# Patient Record
Sex: FEMALE | Race: ASIAN | NOT HISPANIC OR LATINO | Employment: OTHER | URBAN - METROPOLITAN AREA
[De-identification: names, ages, dates, MRNs, and addresses within clinical notes are randomized per-mention and may not be internally consistent; named-entity substitution may affect disease eponyms.]

---

## 2017-03-10 ENCOUNTER — OFFICE VISIT (OUTPATIENT)
Dept: ENDOCRINOLOGY | Facility: CLINIC | Age: 62
End: 2017-03-10
Attending: FAMILY MEDICINE
Payer: COMMERCIAL

## 2017-03-10 VITALS
HEART RATE: 66 BPM | HEIGHT: 60 IN | SYSTOLIC BLOOD PRESSURE: 116 MMHG | BODY MASS INDEX: 27.76 KG/M2 | DIASTOLIC BLOOD PRESSURE: 72 MMHG | WEIGHT: 141.4 LBS

## 2017-03-10 DIAGNOSIS — Z83.49 FAMILY HISTORY OF THYROID DISEASE: ICD-10-CM

## 2017-03-10 DIAGNOSIS — M85.80 OSTEOPENIA: ICD-10-CM

## 2017-03-10 DIAGNOSIS — E04.2 NON-TOXIC MULTINODULAR GOITER: Primary | ICD-10-CM

## 2017-03-10 LAB — TSH SERPL DL<=0.005 MIU/L-ACNC: 1.37 MU/L (ref 0.4–4)

## 2017-03-10 PROCEDURE — 82306 VITAMIN D 25 HYDROXY: CPT | Performed by: INTERNAL MEDICINE

## 2017-03-10 PROCEDURE — 86376 MICROSOMAL ANTIBODY EACH: CPT | Performed by: INTERNAL MEDICINE

## 2017-03-10 PROCEDURE — 99205 OFFICE O/P NEW HI 60 MIN: CPT | Performed by: INTERNAL MEDICINE

## 2017-03-10 PROCEDURE — 36415 COLL VENOUS BLD VENIPUNCTURE: CPT | Performed by: INTERNAL MEDICINE

## 2017-03-10 PROCEDURE — 84443 ASSAY THYROID STIM HORMONE: CPT | Performed by: INTERNAL MEDICINE

## 2017-03-10 NOTE — LETTER
3/10/2017      RE: [facility] Pino  [address]  [facility] [address]                - Endocrinology Initial Consultation -    Reason for visit/consult:  Thyroid nodules     Primary care provider: Bryanna Sarabia    HPI: 60 yo female here for the evaluation of her thyroid nodules. She has been asymptomatic but she has strong family history of thyroid disease, her sisters (nurses) recommended to check thyroid. Upon her PMD visit, sonogram was sent and showing 3 nodules (2 on the right, one in the left).   Family history of thyroid disease: Mother had goiter, all of her sisters have thyroid issues including cold temperature intolerance. This is the first sonogram for her. Never had thyroid biopsy before.   Asymptomatic.  BW: seasonal fluctuations but stable, no heat/cold intolerance, no constipation, no significant hair loss.  Foods allergy: shrimp,  Energy level: good , very active, full time work  Ethnicity:  female  Prior fragility fracture:no  Parental history of hip fracture:no  Rheumatoid arthritis:no  Secondary cause of osteoporosis:no  Body habitus (BMI less than or equal to 20):no     Sedentary lifestyle: no  Current tabacco smoking:no  History of thyroid disorder:yes  Calcuim and Vitmin D supplements: calcium VitD      Past Medical/Surgical History:  Past Medical History   Diagnosis Date     Allergic rhinitis due to pollen      depression and anxiety-situational      resolved     Dermatophytosis of nail      Hypopotassemia      resolved     Irregular menstrual cycle      Other and unspecified hyperlipidemia      Other chest pain      due to anxiety-neg stress echo      Panic disorder without agoraphobia 2006     resolved     Tennis elbow 2010     Past Surgical History   Procedure Laterality Date      section       x2     Lumpectomy breast  age 23     benign       Allergies:  Allergies   Allergen Reactions     Tuna Flavor Swelling     hives      "Chicken Allergy Hives     Eggs Hives     Shrimp Hives     Tylenol Hives       Current Medications   Current Outpatient Prescriptions   Medication     atorvastatin (LIPITOR) 40 MG tablet     Calcium Citrate-Vitamin D (CALCIUM + D PO)     Cholecalciferol (VITAMIN D3 PO)     No current facility-administered medications for this visit.        Family History:  Family History   Problem Relation Age of Onset     CANCER Mother      pancreatic cancer,  60     DIABETES Maternal Grandmother      Asthma No family hx of      C.A.D. No family hx of      Hypertension No family hx of      CEREBROVASCULAR DISEASE No family hx of      Cancer - colorectal No family hx of      Prostate Cancer No family hx of      DIABETES Mother      Breast Cancer Sister      Thyroid Disease Sister      Thyroid Disease Mother    8 sisters among  them 5 are RN in [place].     Social History:  Social History   Substance Use Topics     Smoking status: Never Smoker     Smokeless tobacco: Never Used      Comment: smoke free household.     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  Lives with , and two sons from first .     ROS:  Full review of systems taken with the help of the intake sheet. Otherwise a complete 14 point review of systems was taken and is negative unless stated in the history above.      Physical Exam:   Blood pressure 116/72, pulse 66, height 1.53 m (5' 0.25\"), weight 64.1 kg (141 lb 6.4 oz).  General: well appearing, no acute distress, pleasant and conversant,   Mental Status/neuro: alert and oriented  Face: symmetrical, normal facial color  Skull: no thinning hair roots  Eyes: anicteric, PERRL, no proptosis or lid lag  Neck: suppler, no lymphadenopahty  Thyroid: mildly enlarged size and coarse texture, no bruits  Back: no scoliosis, no kyphosis  Skin: no dry skin  Heart: regular rhythm, S1S2, no murmur appreciated  Lung: clear to auscultation bilaterally  Abdomen: soft, NT/ND, no " hepatomegaly  Legs: no swelling or edema        Labs (General):   Lab Results   Component Value Date     10/31/2016      Lab Results   Component Value Date    POTASSIUM 3.9 10/31/2016     Lab Results   Component Value Date    CHLORIDE 107 10/31/2016     Lab Results   Component Value Date    KADE 8.9 10/31/2016     Lab Results   Component Value Date    CO2 28 10/31/2016     Lab Results   Component Value Date    BUN 19 10/31/2016     Lab Results   Component Value Date    CR 0.62 10/31/2016     Lab Results   Component Value Date     10/31/2016     Lab Results   Component Value Date    TSH 1.59 10/31/2016     No results found for: T4  Lab Results   Component Value Date    A1C 5.9 10/31/2016         Labs (Hormone Panel):  I extracted pertinent thyroid function test from epic and reviewed since 2007 and also explained to the patient.      Ref. Range 3/2/2007 09:33 10/15/2012 07:59 1/28/2013 13:52 4/7/2014 09:24 10/31/2016 08:23   TSH Latest Ref Range: 0.40 - 4.00 mU/L 1.05 3.37 2.02 2.19 1.59       Sonogram THYROID, 12/22/2016 : I personally reviewed original images and interpreted as #1 isoechoic solid, #2 partial cystic with eccentric solid, #3 partial cystic with eccentric solid. Size agree with report.        (report)  Technique: Grayscale and color ultrasound imaging of the thyroid was  performed.     Findings:   Thyroid parenchyma: homogenous  The right lobe of the thyroid measures: 3.8 x 2.4 x 1.5 cm   The thyroid isthmus measures: 0.8 cm   The left lobe of the thyroid measures: 4.1 x 2.1 x 2 cm      Right lobe:  Nodule 1: Mid  Nodule measurement: 1.3 x 1.4 x 1 cm ,  Echogenicity: Isoechoic  Consistency: solid  Calcifications: coarse  Hypervascular: no     Nodule 2: Inferior  Nodule measurement: 0.8 x 0.9 x 0.5 cm ,  Echogenicity: Isoechoic  Consistency: mixed  Calcifications: no  Hypervascular: no     Left Lobe:   Nodule 1: Mid  Nodule measurement: 1.6 x 1 x 1.2 cm ,  Echogenicity:  Isoechoic  Consistency: mixed  Calcifications: no  Hypervascular: no     Nodule 2: Middle inferior  Nodule measurement: 0.5 x 0.6 x 0.3 cm ,  Echogenicity: Hypoechoic  Consistency: cystic  Calcifications: no  Hypervascular: no  Impression:  Thyroid nodules as described.    2/22/2013 DXA    a. Based on the most negative and valid T -score of - 1.4 at the   level of the L1 - L2 lumbar spine, this patient has low bone density.   The risk of osteoporotic fracture increases approximately 2-fold for   each 1.0 SD decrease in T-score. Low bone density is not the only   risk factor for fracture; also consider factors such as patient's   age, risk of falling, risk of injury, previous osteoporotic fracture,   family history of osteoporosis, etc.          b. The most negative and valid Z-score of - 0.5 at the level of the   L1 - L2 lumbar spine is WITHIN expected range for age. (According to   the ISCD position statements at www.iscd.org, a Z-score more negative   than -2.0 is below expected range for age and suggests secondary   causes of low bone density might be present.)          c. According to the ISCD position statements (see reference # 3),   vertebrae may be excluded from analysis when there is more than a 1.0   T-score difference between vertebra. BMD of the remaining vertebrae   is used to derive the T-score. Note the wide range in BMD values and   T-scores within the lumbar spine. In the circumstances, the lumbar   spine is represented by L1 - L2      Assessment and Plan  61 year old female with thyroid nodules and osteopenia,  # All nodules are similar risk category 5-10% risk likely in my impression (referred CLIFF guideline), however due to the size, and patient preference (she prefers and agreed to do work up), we will to FNA this time If benign, we will follow up sonogram only every 2-3 years unless changes size or content.    # Regarding osteopenia in 2013, agree with next bone density check, and also discussed  bone health for prevention. Discussed detailed about Ca VitD supplement today.     - TSH, free T4, TPO antibody  - FNA thyroid biopsy to schedule for all nodules #1, #2, #3  - Calcium citrate +D to start 9Ca 1260 mg, VitD 1000 IU/day)- I gave detailed information and printed out for her.   - Dietary source of Ca dn Vitmin D - I gave hand out today.  - Bone density  -RTC with me in 1 year      I spent 60 minutes with this patient face to face and explained the conditions and plans (more than 50% of time was counseling/coordination of care, bone health and supplement, reviewing thyroid sonogram together and basic thyroid physiology and basic anatomy to sonogram, and follow up plan for biopsy) . The patient understood and is satisfied with today's visit. Return to clinic with me in 1 year.         Lynn Jasso MD  Staff Physician  Endocrinology and Metabolism  License: [id number]    Lynn Jasso MD

## 2017-03-10 NOTE — MR AVS SNAPSHOT
After Visit Summary   3/10/2017    [facility] Pino    MRN: [id number]           Patient Information     Date Of Birth          1955        Visit Information        Provider Department      3/10/2017 2:00 PM Lynn Jasso MD [facility]        Today's Diagnoses     Non-toxic multinodular goiter    -  1    Family history of thyroid disease        Osteopenia          Care Instructions          Thank you for choosing the [facility] Department of Endocrinology. It has been a pleasure servicing you today. Please contact us at [phone] with any questions. If you need to speak to an Endocrinologist and it is after 5:00 pm or on a weekend, please call the On-Call Endocrinologist at [phone].      If you are taking Calcium (including Tums), Multivitamin, or Vitamin D; Please discontinue 3 days prior and day of DEXA Scan. It is ok to resume these medications after the scan is complete unless indicated by your doctor. Please check in at the [facility] Entrance. Desk #2.         Getting Ready for Ultrasound-guided Biopsy (FNA)  You will have a biopsy of your:  Thyroid   What is this test?  This test uses a very thin needle to remove tissue or other cells from your body. We then send the cells to a lab for testing.   Pictures from an ultrasound will help guide the needle to the right place. (Ultrasound uses sound waves to create pictures of the body on a video screen. You will not feel the sound waves.)  How do I get ready?  Tell us in advance if there s any chance you may be pregnant.  Bring a list of your medicines to the exam. Include vitamins, minerals and over-the-counter drugs.   ? If you take blood thinners, such as Aspirin or Ibuprofen, you will need to stop taking them a few days before treatment. Talk to your doctor before stopping these medicines.    Talk to your doctor before stopping these medicines:  ? Stop taking Coumadin (warfarin) 3 days before  treatment. Restart the day after treatment.   ? Stop taking Coumadin (warfarin) 5 days before treatment. Restart the day after treatment.  ? If you take Plavix, Ticlid, Pletal or Persantine, please ask your doctor if you should stop these medicines. You may need extra tests on the morning of your scan.    What will happen during my test?    Please arrive 15 minutes early. (If you will receive sedation, arrive 60 minutes early.) Wear comfortable clothes and leave your valuables at home.   When it s time for your biopsy:    You will lie on your back or side.    We will do an ultrasound to find the area to be sampled.   - We rub warm gel on your skin. The gel helps us get good pictures during the exam.  - We gently move a wand, or transducer, over your skin. (For prostate exams, we place the wand into the rectum. The wand is about 1 inch wide. It should not hurt, but it may cause discomfort.)  - The wand sends sound waves through your body. The sound waves make pictures on a video screen. You will not feel or hear the sound waves.    Next, we will ask you to stay very still. We will clean the skin and numb it with a shot of medicine. You will feel a slight burning as the skin gets numb.     The doctor will insert the needle and take one or more samples of cells or tissue. This takes only a few seconds. You will feel pressure or slight pain during this time. We may need to move the needle in and out to get enough cells.  You will need to lie quietly for a while after the test. We use an ice pack to reduce swelling or bleeding. You will also have a Band-Aid over the needle site.   When you get home, take it easy for the rest of the day. You may have slight bruising and mild pain in the area. If so, you may take acetaminophen (Tylenol) or ibuprofen (Advil, Motrin) after you get home.  Is it safe?  Your doctor will talk to you about any risks in advance.   When will I know the results?  Your Endocrinologist will give you  the results when they are available.  Who should I call with questions?  Please call the Diagnostic Imaging Department with any questions. [phone].    3/16/18 2:00 follow up Dr Jasso        Follow-ups after your visit        Your next 10 appointments already scheduled     May 16, 2017  1:00 PM CDT   US BIOPSY THYROID FINE NEEDLE ASPIRATION with MGUS1, MG US TECH, MG IMAGING NURSE, MG NEURO RAD   [facility]    [address] [place] [id number] [phone]           Tell us in advance if there s any chance you may be pregnant.  Bring a list of your medicines to the exam. Include vitamins, minerals and over-the-counter drugs.  If you take blood thinners, you may need to stop taking them a few days before treatment. Talk to your doctor before stopping these medicines. You will need a blood test the morning of your exam.   Stop taking Coumadin (warfarin) 3 days before your exam. Restart the day after your exam.   If you take aspirin, you may need to stop taking it 3 days before your scan.   If you take Plavix, Ticlid, Pletal or Persantine, you may need to stop taking them 5 days before your scan. Please talk to your doctor before stopping these medicines.  If you will receive sedation for this test (medicine to help you relax):   See your family doctor for an exam within 30 days of treatment.   Plan for an adult to drive you home and stay with you for at least 6 hours.   Follow the eating and drinking guidelines checked below:   No eating or drinking for 4 hours before your test. You may take medicine with small sips of water.   If you have diabetes:If you take insulin, call your diabetes care team. Do not take diabetes pills on the morning of your test. If you take metformin (Avandamet, Glucophage, Glucovance, Metaglip) and received contrast, wait 48 hours before re-starting this medicine.  Please call the Imaging Department at your exam site with any  questions.            May 16, 2017  2:00 PM CDT   DX HIP/PELVIS/SPINE with MGDX1   [facility] ([facility]    [address] [place] [id number] [phone]           Please do not take any of the following 48 hours prior to your exam: vitamins, calcium tablets, antacids.              Future tests that were ordered for you today     Open Future Orders        Priority Expected Expires Ordered    US Biopsy Thyroid Fine Needle Aspiration Routine  3/10/2018 3/10/2017    DX Hip/Pelvis/Spine Routine  3/10/2018 3/10/2017            Who to contact     If you have questions or need follow up information about today's clinic visit or your schedule please contact [facility] directly at [phone].  Normal or non-critical lab and imaging results will be communicated to you by [employer], letter or phone within 4 business days after the clinic has received the results. If you do not hear from us within 7 days, please contact the clinic through [employer] or phone. If you have a critical or abnormal lab result, we will notify you by phone as soon as possible.  Submit refill requests through [employer] or call your pharmacy and they will forward the refill request to us. Please allow 3 business days for your refill to be completed.          Additional Information About Your Visit        [employer]a Information     Tarena is an electronic gateway that provides easy, online access to your medical records. With Tarena, you can request a clinic appointment, read your test results, renew a prescription or communicate with your care team.     To sign up for [employer]a visit the website at [email or web address]   You will be asked to enter the access code listed below, as well as some personal information. Please follow the directions to create your username and password.     Your access code is: [email or web address]  Expires: 2017  3:12 PM     Your access code will  in 90  "days. If you need help or a new code, please contact your [facility] or call [phone] for assistance.        Care EveryWhere ID     This is your Care EveryWhere ID. This could be used by other organizations to access your [facility] medical records  [id number]        Your Vitals Were     Pulse Height BMI (Body Mass Index)             66 1.53 m (5' 0.25\") 27.39 kg/m2          Blood Pressure from Last 3 Encounters:   03/10/17 116/72   10/31/16 112/70   12/17/15 123/79    Weight from Last 3 Encounters:   03/10/17 64.1 kg (141 lb 6.4 oz)   10/31/16 63.5 kg (139 lb 14.4 oz)   12/30/15 64.6 kg (142 lb 6.4 oz)              We Performed the Following     Thyroid peroxidase antibody     TSH with free T4 reflex     Vitamin D Deficiency        Primary Care Provider Office Phone # Fax #    Bryanna Sarabia [phone]       [facility] [address] [place] [id number]        Thank you!     Thank you for choosing [facility]  for your care. Our goal is always to provide you with excellent care. Hearing back from our patients is one way we can continue to improve our services. Please take a few minutes to complete the written survey that you may receive in the mail after your visit with us. Thank you!             Your Updated Medication List - Protect others around you: Learn how to safely use, store and throw away your medicines at www.disposemymeds.org.          This list is accurate as of: 3/10/17  3:12 PM.  Always use your most recent med list.                   Brand Name Dispense Instructions for use    atorvastatin 40 MG tablet    LIPITOR    45 tablet    TAKE ONE-HALF TABLET BY MOUTH EVERY DAY       CALCIUM + D PO          VITAMIN D3 PO      Take by mouth daily         "

## 2017-03-10 NOTE — NURSING NOTE
"Mer[facility] Pino's goals for this visit include:   Chief Complaint   Patient presents with     Endocrine Problem       She requests these members of her care team be copied on today's visit information: Yes PCP    PCP: Bryanna Sarabia    Referring Provider:  Bryanna Sarabia MD  [address] [address]    Chief Complaint   Patient presents with     Endocrine Problem       Initial /72  Pulse 66  Ht 1.53 m (5' 0.25\")  Wt 64.1 kg (141 lb 6.4 oz)  BMI 27.39 kg/m2 Estimated body mass index is 27.39 kg/(m^2) as calculated from the following:    Height as of this encounter: 1.53 m (5' 0.25\").    Weight as of this encounter: 64.1 kg (141 lb 6.4 oz).  Medication Reconciliation: complete    Do you need any medication refills at today's visit? NO    "

## 2017-03-10 NOTE — PROGRESS NOTES
- Endocrinology Initial Consultation -    Reason for visit/consult:  Thyroid nodules     Primary care provider: Bryanna Sarabia    HPI: 60 yo female here for the evaluation of her thyroid nodules. She has been asymptomatic but she has strong family history of thyroid disease, her sisters (nurses) recommended to check thyroid. Upon her PMD visit, sonogram was sent and showing 3 nodules (2 on the right, one in the left).   Family history of thyroid disease: Mother had goiter, all of her sisters have thyroid issues including cold temperature intolerance. This is the first sonogram for her. Never had thyroid biopsy before.   Asymptomatic.  BW: seasonal fluctuations but stable, no heat/cold intolerance, no constipation, no significant hair loss.  Foods allergy: shrimp,  Energy level: good , very active, full time work  Ethnicity:  female  Prior fragility fracture:no  Parental history of hip fracture:no  Rheumatoid arthritis:no  Secondary cause of osteoporosis:no  Body habitus (BMI less than or equal to 20):no     Sedentary lifestyle: no  Current tabacco smoking:no  History of thyroid disorder:yes  Calcuim and Vitmin D supplements: calcium VitD      Past Medical/Surgical History:  Past Medical History   Diagnosis Date     Allergic rhinitis due to pollen      depression and anxiety-situational      resolved     Dermatophytosis of nail      Hypopotassemia 2006     resolved     Irregular menstrual cycle      Other and unspecified hyperlipidemia      Other chest pain      due to anxiety-neg stress echo      Panic disorder without agoraphobia 2006     resolved     Tennis elbow 2010     Past Surgical History   Procedure Laterality Date      section       x2     Lumpectomy breast  age 23     benign       Allergies:  Allergies   Allergen Reactions     Tuna Flavor Swelling     hives     Chicken Allergy Hives     Eggs Hives     Shrimp Hives     Tylenol Hives       Current Medications  "  Current Outpatient Prescriptions   Medication     atorvastatin (LIPITOR) 40 MG tablet     Calcium Citrate-Vitamin D (CALCIUM + D PO)     Cholecalciferol (VITAMIN D3 PO)     No current facility-administered medications for this visit.        Family History:  Family History   Problem Relation Age of Onset     CANCER Mother      pancreatic cancer,  60     DIABETES Maternal Grandmother      Asthma No family hx of      C.A.D. No family hx of      Hypertension No family hx of      CEREBROVASCULAR DISEASE No family hx of      Cancer - colorectal No family hx of      Prostate Cancer No family hx of      DIABETES Mother      Breast Cancer Sister      Thyroid Disease Sister      Thyroid Disease Mother    8 sisters among  them 5 are RN in [place].     Social History:  Social History   Substance Use Topics     Smoking status: Never Smoker     Smokeless tobacco: Never Used      Comment: smoke free household.     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  Lives with , and two sons from [place] .     ROS:  Full review of systems taken with the help of the intake sheet. Otherwise a complete 14 point review of systems was taken and is negative unless stated in the history above.      Physical Exam:   Blood pressure 116/72, pulse 66, height 1.53 m (5' 0.25\"), weight 64.1 kg (141 lb 6.4 oz).  General: well appearing, no acute distress, pleasant and conversant,   Mental Status/neuro: alert and oriented  Face: symmetrical, normal facial color  Skull: no thinning hair roots  Eyes: anicteric, PERRL, no proptosis or lid lag  Neck: suppler, no lymphadenopahty  Thyroid: mildly enlarged size and coarse texture, no bruits  Back: no scoliosis, no kyphosis  Skin: no dry skin  Heart: regular rhythm, S1S2, no murmur appreciated  Lung: clear to auscultation bilaterally  Abdomen: soft, NT/ND, no hepatomegaly  Legs: no swelling or edema        Labs (General):   Lab Results   Component Value Date    NA " 141 10/31/2016      Lab Results   Component Value Date    POTASSIUM 3.9 10/31/2016     Lab Results   Component Value Date    CHLORIDE 107 10/31/2016     Lab Results   Component Value Date    KADE 8.9 10/31/2016     Lab Results   Component Value Date    CO2 28 10/31/2016     Lab Results   Component Value Date    BUN 19 10/31/2016     Lab Results   Component Value Date    CR 0.62 10/31/2016     Lab Results   Component Value Date     10/31/2016     Lab Results   Component Value Date    TSH 1.59 10/31/2016     No results found for: T4  Lab Results   Component Value Date    A1C 5.9 10/31/2016         Labs (Hormone Panel):  I extracted pertinent thyroid function test from epic and reviewed since 2007 and also explained to the patient.      Ref. Range 3/2/2007 09:33 10/15/2012 07:59 1/28/2013 13:52 4/7/2014 09:24 10/31/2016 08:23   TSH Latest Ref Range: 0.40 - 4.00 mU/L 1.05 3.37 2.02 2.19 1.59       Sonogram THYROID, 12/22/2016 : I personally reviewed original images and interpreted as #1 isoechoic solid, #2 partial cystic with eccentric solid, #3 partial cystic with eccentric solid. Size agree with report.        (report)  Technique: Grayscale and color ultrasound imaging of the thyroid was  performed.     Findings:   Thyroid parenchyma: homogenous  The right lobe of the thyroid measures: 3.8 x 2.4 x 1.5 cm   The thyroid isthmus measures: 0.8 cm   The left lobe of the thyroid measures: 4.1 x 2.1 x 2 cm      Right lobe:  Nodule 1: Mid  Nodule measurement: 1.3 x 1.4 x 1 cm ,  Echogenicity: Isoechoic  Consistency: solid  Calcifications: coarse  Hypervascular: no     Nodule 2: Inferior  Nodule measurement: 0.8 x 0.9 x 0.5 cm ,  Echogenicity: Isoechoic  Consistency: mixed  Calcifications: no  Hypervascular: no     Left Lobe:   Nodule 1: Mid  Nodule measurement: 1.6 x 1 x 1.2 cm ,  Echogenicity: Isoechoic  Consistency: mixed  Calcifications: no  Hypervascular: no     Nodule 2: Middle inferior  Nodule measurement: 0.5 x  0.6 x 0.3 cm ,  Echogenicity: Hypoechoic  Consistency: cystic  Calcifications: no  Hypervascular: no  Impression:  Thyroid nodules as described.    2/22/2013 DXA    a. Based on the most negative and valid T -score of - 1.4 at the   level of the L1 - L2 lumbar spine, this patient has low bone density.   The risk of osteoporotic fracture increases approximately 2-fold for   each 1.0 SD decrease in T-score. Low bone density is not the only   risk factor for fracture; also consider factors such as patient's   age, risk of falling, risk of injury, previous osteoporotic fracture,   family history of osteoporosis, etc.          b. The most negative and valid Z-score of - 0.5 at the level of the   L1 - L2 lumbar spine is WITHIN expected range for age. (According to   the ISCD position statements at www.iscd.org, a Z-score more negative   than -2.0 is below expected range for age and suggests secondary   causes of low bone density might be present.)          c. According to the ISCD position statements (see reference # 3),   vertebrae may be excluded from analysis when there is more than a 1.0   T-score difference between vertebra. BMD of the remaining vertebrae   is used to derive the T-score. Note the wide range in BMD values and   T-scores within the lumbar spine. In the circumstances, the lumbar   spine is represented by L1 - L2      Assessment and Plan  61 year old female with thyroid nodules and osteopenia,  # All nodules are similar risk category 5-10% risk likely in my impression (referred CLIFF guideline), however due to the size, and patient preference (she prefers and agreed to do work up), we will to FNA this time If benign, we will follow up sonogram only every 2-3 years unless changes size or content.    # Regarding osteopenia in 2013, agree with next bone density check, and also discussed bone health for prevention. Discussed detailed about Ca VitD supplement today.     - TSH, free T4, TPO antibody  - FNA  thyroid biopsy to schedule for all nodules #1, #2, #3  - Calcium citrate +D to start 9Ca 1260 mg, VitD 1000 IU/day)- I gave detailed information and printed out for her.   - Dietary source of Ca dn Vitmin D - I gave hand out today.  - Bone density  -RTC with me in 1 year      I spent 60 minutes with this patient face to face and explained the conditions and plans (more than 50% of time was counseling/coordination of care, bone health and supplement, reviewing thyroid sonogram together and basic thyroid physiology and basic anatomy to sonogram, and follow up plan for biopsy) . The patient understood and is satisfied with today's visit. Return to clinic with me in 1 year.         Lynn Jasso MD  Staff Physician  Endocrinology and Metabolism  License: [id number]

## 2017-03-10 NOTE — PATIENT INSTRUCTIONS
Thank you for choosing the [facility], Department of Endocrinology. It has been a pleasure servicing you today. Please contact us at [phone] with any questions. If you need to speak to an Endocrinologist and it is after 5:00 pm or on a weekend, please call the On-Call Endocrinologist at [phone].      If you are taking Calcium (including Tums), Multivitamin, or Vitamin D; Please discontinue 3 days prior and day of DEXA Scan. It is ok to resume these medications after the scan is complete unless indicated by your doctor. Please check in at the [facility] Entr[facility]. Desk #2.         Getting Ready for Ultrasound-guided Biopsy (FNA)  You will have a biopsy of your:  Thyroid   What is this test?  This test uses a very thin needle to remove tissue or other cells from your body. We then send the cells to a lab for testing.   Pictures from an ultrasound will help guide the needle to the right place. (Ultrasound uses sound waves to create pictures of the body on a video screen. You will not feel the sound waves.)  How do I get ready?  Tell us in advance if there s any chance you may be pregnant.  Bring a list of your medicines to the exam. Include vitamins, minerals and over-the-counter drugs.   ? If you take blood thinners, such as Aspirin or Ibuprofen, you will need to stop taking them a few days before treatment. Talk to your doctor before stopping these medicines.    Talk to your doctor before stopping these medicines:  ? Stop taking Coumadin (warfarin) 3 days before treatment. Restart the day after treatment.   ? Stop taking Coumadin (warfarin) 5 days before treatment. Restart the day after treatment.  ? If you take Plavix, Ticlid, Pletal or Persantine, please ask your doctor if you should stop these medicines. You may need extra tests on the morning of your scan.    What will happen during my test?    Please arrive 15 minutes early. (If you will receive sedation, arrive 60 minutes early.)  Wear comfortable clothes and leave your valuables at home.   When it s time for your biopsy:    You will lie on your back or side.    We will do an ultrasound to find the area to be sampled.     We rub warm gel on your skin. The gel helps us get good pictures during the exam.    We gently move a wand, or transducer, over your skin. (For prostate exams, we place the wand into the rectum. The wand is about 1 inch wide. It should not hurt, but it may cause discomfort.)    The wand sends sound waves through your body. The sound waves make pictures on a video screen. You will not feel or hear the sound waves.    Next, we will ask you to stay very still. We will clean the skin and numb it with a shot of medicine. You will feel a slight burning as the skin gets numb.     The doctor will insert the needle and take one or more samples of cells or tissue. This takes only a few seconds. You will feel pressure or slight pain during this time. We may need to move the needle in and out to get enough cells.  You will need to lie quietly for a while after the test. We use an ice pack to reduce swelling or bleeding. You will also have a Band-Aid over the needle site.   When you get home, take it easy for the rest of the day. You may have slight bruising and mild pain in the area. If so, you may take acetaminophen (Tylenol) or ibuprofen (Advil, Motrin) after you get home.  Is it safe?  Your doctor will talk to you about any risks in advance.   When will I know the results?  Your Endocrinologist will give you the results when they are available.  Who should I call with questions?  Please call the Diagnostic Imaging Department with any questions. [phone].    3/16/18 2:00 follow up Dr Jasso

## 2017-03-13 LAB
DEPRECATED CALCIDIOL+CALCIFEROL SERPL-MC: 43 UG/L (ref 20–75)
THYROPEROXIDASE AB SERPL-ACNC: <10 IU/ML

## 2017-05-16 ENCOUNTER — RADIANT APPOINTMENT (OUTPATIENT)
Dept: ULTRASOUND IMAGING | Facility: CLINIC | Age: 62
End: 2017-05-16
Attending: INTERNAL MEDICINE
Payer: COMMERCIAL

## 2017-05-16 ENCOUNTER — RADIANT APPOINTMENT (OUTPATIENT)
Dept: BONE DENSITY | Facility: CLINIC | Age: 62
End: 2017-05-16
Attending: INTERNAL MEDICINE
Payer: COMMERCIAL

## 2017-05-16 DIAGNOSIS — M85.80 OSTEOPENIA: ICD-10-CM

## 2017-05-16 DIAGNOSIS — E04.2 NON-TOXIC MULTINODULAR GOITER: ICD-10-CM

## 2017-05-16 PROCEDURE — 00000102 ZZHCL STATISTIC CYTO WRIGHT STAIN TC: Performed by: FAMILY MEDICINE

## 2017-05-16 PROCEDURE — 77080 DXA BONE DENSITY AXIAL: CPT | Performed by: RADIOLOGY

## 2017-05-16 PROCEDURE — 76942 ECHO GUIDE FOR BIOPSY: CPT | Performed by: RADIOLOGY

## 2017-05-16 PROCEDURE — 88173 CYTOPATH EVAL FNA REPORT: CPT | Performed by: FAMILY MEDICINE

## 2017-05-16 PROCEDURE — 10022 US BIOPSY THYROID FINE NEEDLE ASPIRATION: CPT | Performed by: RADIOLOGY

## 2017-05-16 NOTE — PROGRESS NOTES
Mercy was seen in the ultrasound today for a fine needle aspiration of a right and left thyroid nodule. Procedure was explained and consent was obtained. The entire neck was prepped and sterile drape was applied. 9 ml Lidocaine (NDC 7763-1412-02) with 1 ml 8.4% Na Bicarbonate (NDC 7789-2507-30) was used to anesthetize the skin and subcutaneous tissue.  Procedure was performed with no complications.  Pain at start of procedure 0/10. Pain at end of procedure 0/10  Patient d/c with home care and follow-up instructions.  Procedure performed by: Dr Zavala  Other staff present: Tiny

## 2017-05-18 LAB — COPATH REPORT: NORMAL

## 2017-05-25 NOTE — PROGRESS NOTES
I reviwed the result, Right - benign                           Left- non diagnostic.  I called patient for the result, she said it was so painful for this producre and want to take a break to repeat. We will schedule next time when she retruens my clinic, and will see only left 1.6x1.0x1.2 nodule only    Lynn Jasso MD 5/25/2017 8:59 AM

## 2017-09-15 ENCOUNTER — RADIANT APPOINTMENT (OUTPATIENT)
Dept: MAMMOGRAPHY | Facility: CLINIC | Age: 62
End: 2017-09-15
Attending: FAMILY MEDICINE
Payer: COMMERCIAL

## 2017-09-15 DIAGNOSIS — Z12.31 VISIT FOR SCREENING MAMMOGRAM: ICD-10-CM

## 2017-09-15 PROCEDURE — G0202 SCR MAMMO BI INCL CAD: HCPCS | Performed by: RADIOLOGY

## 2017-09-15 PROCEDURE — 77063 BREAST TOMOSYNTHESIS BI: CPT | Performed by: RADIOLOGY

## 2017-10-30 DIAGNOSIS — E78.5 HYPERLIPIDEMIA, UNSPECIFIED HYPERLIPIDEMIA TYPE: ICD-10-CM

## 2017-10-30 NOTE — TELEPHONE ENCOUNTER
atorvastatin (LIPITOR) 40 MG tablet     Last Written Prescription Date: 10/31/16  Last Fill Quantity: 45, # refills: 3  Last Office Visit with G, P or [facility] prescribing provider: 10/31/16       Lab Results   Component Value Date    CHOL 158 10/31/2016     Lab Results   Component Value Date    HDL 55 10/31/2016     Lab Results   Component Value Date    LDL 68 10/31/2016     Lab Results   Component Value Date    TRIG 173 10/31/2016     Lab Results   Component Value Date    CHOLHDLRATIO 2.8 07/10/2015

## 2017-11-03 RX ORDER — ATORVASTATIN CALCIUM 40 MG/1
20 TABLET, FILM COATED ORAL DAILY
Qty: 45 TABLET | Refills: 0 | Status: SHIPPED | OUTPATIENT
Start: 2017-11-03 | End: 2017-12-20

## 2017-12-19 ENCOUNTER — OFFICE VISIT (OUTPATIENT)
Dept: FAMILY MEDICINE | Facility: CLINIC | Age: 62
End: 2017-12-19
Payer: COMMERCIAL

## 2017-12-19 VITALS
WEIGHT: 143 LBS | OXYGEN SATURATION: 98 % | SYSTOLIC BLOOD PRESSURE: 102 MMHG | HEART RATE: 87 BPM | DIASTOLIC BLOOD PRESSURE: 72 MMHG | BODY MASS INDEX: 28.07 KG/M2 | TEMPERATURE: 97.9 F | HEIGHT: 60 IN | RESPIRATION RATE: 16 BRPM

## 2017-12-19 DIAGNOSIS — Z00.00 ENCOUNTER FOR ROUTINE ADULT HEALTH EXAMINATION WITHOUT ABNORMAL FINDINGS: Primary | ICD-10-CM

## 2017-12-19 DIAGNOSIS — R73.03 PRE-DIABETES: ICD-10-CM

## 2017-12-19 DIAGNOSIS — Z12.11 SCREEN FOR COLON CANCER: ICD-10-CM

## 2017-12-19 DIAGNOSIS — M79.601 PAIN OF RIGHT UPPER EXTREMITY: ICD-10-CM

## 2017-12-19 DIAGNOSIS — E04.2 NON-TOXIC MULTINODULAR GOITER: ICD-10-CM

## 2017-12-19 DIAGNOSIS — Z13.1 SCREENING FOR DIABETES MELLITUS: ICD-10-CM

## 2017-12-19 DIAGNOSIS — E78.5 HYPERLIPIDEMIA, UNSPECIFIED HYPERLIPIDEMIA TYPE: ICD-10-CM

## 2017-12-19 DIAGNOSIS — Z13.21 ENCOUNTER FOR VITAMIN DEFICIENCY SCREENING: ICD-10-CM

## 2017-12-19 DIAGNOSIS — E78.5 HYPERLIPIDEMIA LDL GOAL <130: ICD-10-CM

## 2017-12-19 LAB
ALBUMIN SERPL-MCNC: 3.7 G/DL (ref 3.4–5)
ALP SERPL-CCNC: 78 U/L (ref 40–150)
ALT SERPL W P-5'-P-CCNC: 48 U/L (ref 0–50)
ANION GAP SERPL CALCULATED.3IONS-SCNC: 6 MMOL/L (ref 3–14)
AST SERPL W P-5'-P-CCNC: 28 U/L (ref 0–45)
BILIRUB SERPL-MCNC: 0.4 MG/DL (ref 0.2–1.3)
BUN SERPL-MCNC: 15 MG/DL (ref 7–30)
CALCIUM SERPL-MCNC: 9.1 MG/DL (ref 8.5–10.1)
CHLORIDE SERPL-SCNC: 105 MMOL/L (ref 94–109)
CHOLEST SERPL-MCNC: 177 MG/DL
CO2 SERPL-SCNC: 29 MMOL/L (ref 20–32)
CREAT SERPL-MCNC: 0.58 MG/DL (ref 0.52–1.04)
GFR SERPL CREATININE-BSD FRML MDRD: >90 ML/MIN/1.7M2
GLUCOSE SERPL-MCNC: 129 MG/DL (ref 70–99)
HBA1C MFR BLD: 6.2 % (ref 4.3–6)
HDLC SERPL-MCNC: 52 MG/DL
LDLC SERPL CALC-MCNC: 72 MG/DL
NONHDLC SERPL-MCNC: 125 MG/DL
POTASSIUM SERPL-SCNC: 4.2 MMOL/L (ref 3.4–5.3)
PROT SERPL-MCNC: 7.7 G/DL (ref 6.8–8.8)
SODIUM SERPL-SCNC: 140 MMOL/L (ref 133–144)
TRIGL SERPL-MCNC: 264 MG/DL

## 2017-12-19 PROCEDURE — 99396 PREV VISIT EST AGE 40-64: CPT | Performed by: PHYSICIAN ASSISTANT

## 2017-12-19 PROCEDURE — 83036 HEMOGLOBIN GLYCOSYLATED A1C: CPT | Performed by: PHYSICIAN ASSISTANT

## 2017-12-19 PROCEDURE — 80053 COMPREHEN METABOLIC PANEL: CPT | Performed by: PHYSICIAN ASSISTANT

## 2017-12-19 PROCEDURE — 82306 VITAMIN D 25 HYDROXY: CPT | Performed by: PHYSICIAN ASSISTANT

## 2017-12-19 PROCEDURE — 36415 COLL VENOUS BLD VENIPUNCTURE: CPT | Performed by: PHYSICIAN ASSISTANT

## 2017-12-19 PROCEDURE — 80061 LIPID PANEL: CPT | Performed by: PHYSICIAN ASSISTANT

## 2017-12-19 NOTE — PATIENT INSTRUCTIONS
Bring in FIT card for colon cancer screening.  Continue calcium with vitamin D.  Follow up with endocrinology- make sure provider is in your network by calling your insurance company    Preventive Health Recommendations  Female Ages 50 - 64    Yearly exam: See your health care provider every year in order to  o Review health changes.   o Discuss preventive care.    o Review your medicines if your doctor has prescribed any.      Get a Pap test every three years (unless you have an abnormal result and your provider advises testing more often).    If you get Pap tests with HPV test, you only need to test every 5 years, unless you have an abnormal result.     You do not need a Pap test if your uterus was removed (hysterectomy) and you have not had cancer.    You should be tested each year for STDs (sexually transmitted diseases) if you're at risk.     Have a mammogram every 1 to 2 years.    Have a colonoscopy at age 50, or have a yearly FIT test (stool test). These exams screen for colon cancer.      Have a cholesterol test every 5 years, or more often if advised.    Have a diabetes test (fasting glucose) every three years. If you are at risk for diabetes, you should have this test more often.     If you are at risk for osteoporosis (brittle bone disease), think about having a bone density scan (DEXA).    Shots: Get a flu shot each year. Get a tetanus shot every 10 years.    Nutrition:     Eat at least 5 servings of fruits and vegetables each day.    Eat whole-grain bread, whole-wheat pasta and brown rice instead of white grains and rice.    Talk to your provider about Calcium and Vitamin D.     Lifestyle    Exercise at least 150 minutes a week (30 minutes a day, 5 days a week). This will help you control your weight and prevent disease.    Limit alcohol to one drink per day.    No smoking.     Wear sunscreen to prevent skin cancer.     See your dentist every six months for an exam and cleaning.    See your eye doctor  every 1 to 2 years.

## 2017-12-19 NOTE — PROGRESS NOTES
SUBJECTIVE:   CC: Mercy Pino is an 62 year old woman who presents for preventive health visit.     Healthy Habits:    Do you get at least three servings of calcium containing foods daily (dairy, green leafy vegetables, etc.)? yes    Amount of exercise or daily activities, outside of work: 3 day(s) per week    Problems taking medications regularly No    Medication side effects: No    Have you had an eye exam in the past two years? yes    Do you see a dentist twice per year? yes    Do you have sleep apnea, excessive snoring or daytime drowsiness?no      Today's PHQ-2 Score:   PHQ-2 ( 1999 [employer]) 12/19/2017 10/31/2016   Q1: Little interest or pleasure in doing things 0 0   Q2: Feeling down, depressed or hopeless 0 0   PHQ-2 Score 0 0         Abuse: Current or Past(Physical, Sexual or Emotional)- No  Do you feel safe in your environment - Yes  Social History   Substance Use Topics     Smoking status: Never Smoker     Smokeless tobacco: Never Used      Comment: smoke free household.     Alcohol use No     If you drink alcohol do you typically have >3 drinks per day or >7 drinks per week? Occasionally                     Reviewed orders with patient.  Reviewed health maintenance and updated orders accordingly - Yes  BP Readings from Last 3 Encounters:   12/19/17 102/72   03/10/17 116/72   10/31/16 112/70    Wt Readings from Last 3 Encounters:   12/19/17 64.9 kg (143 lb)   03/10/17 64.1 kg (141 lb 6.4 oz)   10/31/16 63.5 kg (139 lb 14.4 oz)                  Patient Active Problem List   Diagnosis     Dermatophytosis of nail     Contact dermatitis and other eczema, due to unspecified cause     Plantar fascial fibromatosis     Family history of diabetes mellitus     HYPERLIPIDEMIA LDL GOAL <130     Pre-diabetes     Cervical polyp     Anxiety     Food allergies     Advanced directives, counseling/discussion     Health Care Home     Osteopenia     Bacterial keratitis, os     Corneal erosion syndrome?     Overweight  "    Past Surgical History:   Procedure Laterality Date      SECTION      x2     LUMPECTOMY BREAST  age 23    benign       Social History   Substance Use Topics     Smoking status: Never Smoker     Smokeless tobacco: Never Used      Comment: smoke free household.     Alcohol use No     Family History   Problem Relation Age of Onset     CANCER Mother      pancreatic cancer,  60     DIABETES Mother      Thyroid Disease Mother      DIABETES Maternal Grandmother      Breast Cancer Sister      Thyroid Disease Sister      Asthma No family hx of      C.A.D. No family hx of      Hypertension No family hx of      CEREBROVASCULAR DISEASE No family hx of      Cancer - colorectal No family hx of      Prostate Cancer No family hx of          Current Outpatient Prescriptions   Medication Sig Dispense Refill     atorvastatin (LIPITOR) 40 MG tablet Take 0.5 tablets (20 mg) by mouth daily Due for annual office visit prior to further refills 45 tablet 0     Calcium Citrate-Vitamin D (CALCIUM + D PO)        Cholecalciferol (VITAMIN D3 PO) Take by mouth daily             Patient over age 50, mutual decision to screen reflected in health maintenance.      Pertinent mammograms are reviewed under the imaging tab.  History of abnormal Pap smear: NO - age 30- 65 PAP every 3 years recommended    Reviewed and updated as needed this visit by clinical staffTobacco  Allergies  Meds  Med Hx  Surg Hx  Fam Hx  Soc Hx        Reviewed and updated as needed this visit by Provider  Tobacco  Allergies  Meds  Problems  Med Hx  Surg Hx  Fam Hx  Soc Hx           Pain right upper arm.  No other myalgias.   Right hand dominant.  Works at [employer].  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  No numbness or tingling. No loss of range of motion of right shoulder.     Declines flu vaccine and tdap    Wonders about referral to different endocrinologist- does not think the endocrinologist " "she saw for nontoxic multinodular goiter is in her network.  Can't be seen till March.      ROS:  C: NEGATIVE for fever, chills, change in weight  I: NEGATIVE for worrisome rashes, moles or lesions  E: NEGATIVE for vision changes or irritation  ENT: NEGATIVE for ear, mouth and throat problems  R: NEGATIVE for significant cough or SOB  B: NEGATIVE for masses, tenderness or discharge  CV: NEGATIVE for chest pain, palpitations or peripheral edema  GI: NEGATIVE for nausea, abdominal pain, heartburn, or change in bowel habits  : NEGATIVE for unusual urinary or vaginal symptoms. No vaginal bleeding.  MUSCULOSKELETAL:right arm discomfort right deltoid.  No other muscle aches. Is right hand dominanat  N: NEGATIVE for weakness, dizziness or paresthesias  P: NEGATIVE for changes in mood or affect     OBJECTIVE:   /72 (BP Location: Right arm, Patient Position: Sitting, Cuff Size: Adult Regular)  Pulse 87  Temp 97.9  F (36.6  C) (Oral)  Resp 16  Ht 1.53 m (5' 0.25\")  Wt 64.9 kg (143 lb)  SpO2 98%  BMI 27.7 kg/m2  EXAM:  GENERAL APPEARANCE: healthy, alert and no distress  EYES: Eyes grossly normal to inspection, PERRL and conjunctivae and sclerae normal  HENT: ear canals and TM's normal, nose and mouth without ulcers or lesions, oropharynx clear and oral mucous membranes moist  NECK: no adenopathy, no asymmetry, masses, or scars and thyroid normal to palpation  RESP: lungs clear to auscultation - no rales, rhonchi or wheezes  BREAST: normal without masses, tenderness or nipple discharge and no palpable axillary masses or adenopathy  CV: regular rate and rhythm, normal S1 S2, no S3 or S4, no murmur, click or rub, no peripheral edema and peripheral pulses strong  ABDOMEN: soft, nontender, no hepatosplenomegaly, no masses and bowel sounds normal   (female): normal female external genitalia, normal urethral meatus, vaginal mucosal atrophy noted, normal cervix, adnexae, and uterus without masses. and deferred   MS: " no musculoskeletal defects are noted and gait is age appropriate without ataxia  Right shoulder no tenderness of ac joint. Normal range of motion.  Normal sensation lateral deltoid.  Normal radial pulse and sensation distally- pain is right triceps and biceps area   SKIN: no suspicious lesions or rashes  NEURO: Normal strength and tone, sensory exam grossly normal, mentation intact and speech normal  PSYCH: mentation appears normal and affect normal/bright    ASSESSMENT/PLAN:   1. Encounter for routine adult health examination without abnormal findings    - Lipid panel reflex to direct LDL Fasting  - Comprehensive metabolic panel  - Hemoglobin A1c    2. Pain of right upper extremity  Patient has a week off and hoping pain improves-seems muscular.  Will follow up with us if symptoms persist.     3. Screen for colon cancer  Declines colonoscopy.  Agreeable to FIT card.   - Fecal colorectal cancer screen (FIT); Future    4. Non-toxic multinodular goiter  Follow up with endocrinology per their recommenedations- encouraged to check into insurance coverage  - ENDOCRINOLOGY ADULT REFERRAL    5. Hyperlipidemia LDL goal <130   Normal LDL - triglycerides elevated will recommend lifestyle changes. Continue lipitor at current dose.  - Lipid panel reflex to direct LDL Fasting  - Comprehensive metabolic panel    6. Screening for diabetes mellitus    - Hemoglobin A1c    7. Pre-diabetes    - Hemoglobin A1c    8. Encounter for vitamin deficiency screening    - 25 Hydroxyvitamin D2 and D3    COUNSELING:   Reviewed preventive health counseling, as reflected in patient instructions       Regular exercise       Healthy diet/nutrition       Vision screening       Immunizations    Declined: Influenza and TDAP due to Concerns about side effects/safety             Osteoporosis Prevention/Bone Health       Colon cancer screening           reports that she has never smoked. She has never used smokeless tobacco.    Estimated body mass index is  "27.7 kg/(m^2) as calculated from the following:    Height as of this encounter: 1.53 m (5' 0.25\").    Weight as of this encounter: 64.9 kg (143 lb).   Weight management plan: Discussed healthy diet and exercise guidelines and patient will follow up in 12 months in clinic to re-evaluate.    Counseling Resources:  ATP IV Guidelines  Pooled Cohorts Equation Calculator  Breast Cancer Risk Calculator  FRAX Risk Assessment  ICSI Preventive Guidelines  Dietary Guidelines for Americans, 2010  USDA's MyPlate  ASA Prophylaxis  Lung CA Screening    Nancy Carver PA-C  [facility]  "

## 2017-12-19 NOTE — MR AVS SNAPSHOT
After Visit Summary   12/19/2017    Mercy Pino    MRN: [id number]           Patient Information     Date Of Birth          1955        Visit Information        Provider Department      12/19/2017 11:40 AM Nacny Carver PA-C [facility]        Today's Diagnoses     Screen for colon cancer    -  1    Non-toxic multinodular goiter        Encounter for routine adult health examination without abnormal findings        Hyperlipidemia LDL goal <130        Screening for diabetes mellitus        Pre-diabetes        Encounter for vitamin deficiency screening          Care Instructions    Bring in FIT card for colon cancer screening.  Continue calcium with vitamin D.  Follow up with endocrinology- make sure provider is in your network by calling your insurance company    Preventive Health Recommendations  Female Ages 50 - 64    Yearly exam: See your health care provider every year in order to  o Review health changes.   o Discuss preventive care.    o Review your medicines if your doctor has prescribed any.      Get a Pap test every three years (unless you have an abnormal result and your provider advises testing more often).    If you get Pap tests with HPV test, you only need to test every 5 years, unless you have an abnormal result.     You do not need a Pap test if your uterus was removed (hysterectomy) and you have not had cancer.    You should be tested each year for STDs (sexually transmitted diseases) if you're at risk.     Have a mammogram every 1 to 2 years.    Have a colonoscopy at age 50, or have a yearly FIT test (stool test). These exams screen for colon cancer.      Have a cholesterol test every 5 years, or more often if advised.    Have a diabetes test (fasting glucose) every three years. If you are at risk for diabetes, you should have this test more often.     If you are at risk for osteoporosis (brittle bone disease), think about having a bone density scan  (DEXA).    Shots: Get a flu shot each year. Get a tetanus shot every 10 years.    Nutrition:     Eat at least 5 servings of fruits and vegetables each day.    Eat whole-grain bread, whole-wheat pasta and brown rice instead of white grains and rice.    Talk to your provider about Calcium and Vitamin D.     Lifestyle    Exercise at least 150 minutes a week (30 minutes a day, 5 days a week). This will help you control your weight and prevent disease.    Limit alcohol to one drink per day.    No smoking.     Wear sunscreen to prevent skin cancer.     See your dentist every six months for an exam and cleaning.    See your eye doctor every 1 to 2 years.            Follow-ups after your visit        Additional Services     ENDOCRINOLOGY ADULT REFERRAL       Your provider has referred you to: [facility]: [facility] [phone]   [email or web address]  Severino GARCIA [facility] [phone]  [facility] [place] [phone]      Please be aware that coverage of these services is subject to the terms and limitations of your health insurance plan.  Call member services at your health plan with any benefit or coverage questions.      Please bring the following to your appointment:    >>   Any x-rays, CTs or MRIs which have been performed.  Contact the facility where they were done to arrange for  prior to your scheduled appointment.    >>   List of current medications   >>   This referral request   >>   Any documents/labs given to you for this referral                  Future tests that were ordered for you today     Open Future Orders        Priority Expected Expires Ordered    Fecal colorectal cancer screen (FIT) Routine 1/9/2018 3/13/2018 12/19/2017            Who to contact     If you have questions or need follow up information about today's clinic visit or your schedule please  "contact [facility] directly at [phone].  Normal or non-critical lab and imaging results will be communicated to you by MyChart, letter or phone within 4 business days after the clinic has received the results. If you do not hear from us within 7 days, please contact the clinic through MyChart or phone. If you have a critical or abnormal lab result, we will notify you by phone as soon as possible.  Submit refill requests through [employer] or call your pharmacy and they will forward the refill request to us. Please allow 3 business days for your refill to be completed.          Additional Information About Your Visit        [employer] Information     [employer] lets you send messages to your doctor, view your test results, renew your prescriptions, schedule appointments and more. To sign up, go to [email or web address] . Click on \"Log in\" on the left side of the screen, which will take you to the Welcome page. Then click on \"Sign up Now\" on the right side of the page.     You will be asked to enter the access code listed below, as well as some personal information. Please follow the directions to create your username and password.     Your access code is: [email or web address]  Expires: 3/19/2018 12:10 PM     Your access code will  in 90 days. If you need help or a new code, please call your [facility] clinic or [phone].        Care EveryWhere ID     This is your Care EveryWhere ID. This could be used by other organizations to access your [facility] medical records  [id number]        Your Vitals Were     Pulse Temperature Respirations Height Pulse Oximetry BMI (Body Mass Index)    87 97.9  F (36.6  C) (Oral) 16 1.53 m (5' 0.25\") 98% 27.7 kg/m2       Blood Pressure from Last 3 Encounters:   17 102/72   03/10/17 116/72   10/31/16 112/70    Weight from Last 3 Encounters:   17 64.9 kg (143 lb)   03/10/17 64.1 kg (141 lb 6.4 oz)   10/31/16 63.5 kg (139 lb 14.4 oz)              We Performed the " Following     25 Hydroxyvitamin D2 and D3     Comprehensive metabolic panel     ENDOCRINOLOGY ADULT REFERRAL     Hemoglobin A1c     Lipid panel reflex to direct LDL Fasting        Primary Care Provider Office Phone # Fax #    Bryanna Sarabia [phone] [address]  [facility] [address]        Equal Access to Services     BRIANA PEGUERO : Hadii aad ku hadasho Soomaali, waaxda luqadaha, qaybta kaalmada adeegyada, waxmichell shirley florindan ji owensjackynav victoria. So [facility] [phone].    ATENCIÓN: Si habla español, tiene a watkins disposición servicios gratuitos de asistencia lingüística. [facility] [phone].    We comply with applicable federal civil rights laws and [place] laws. We do not discriminate on the basis of race, color, national origin, age, disability, sex, sexual orientation, or gender identity.            Thank you!     Thank you for choosing [facility]  for your care. Our goal is always to provide you with excellent care. Hearing back from our patients is one way we can continue to improve our services. Please take a few minutes to complete the written survey that you may receive in the mail after your visit with us. Thank you!             Your Updated Medication List - Protect others around you: Learn how to safely use, store and throw away your medicines at www.disposemymeds.org.          This list is accurate as of: 12/19/17 12:10 PM.  Always use your most recent med list.                   Brand Name Dispense Instructions for use Diagnosis    atorvastatin 40 MG tablet    LIPITOR    45 tablet    Take 0.5 tablets (20 mg) by mouth daily Due for annual office visit prior to further refills    Hyperlipidemia, unspecified hyperlipidemia type       CALCIUM + D PO           VITAMIN D3 PO      Take by mouth daily

## 2017-12-19 NOTE — NURSING NOTE
"No chief complaint on file.      Initial Ht 1.53 m (5' 0.25\")  Wt 64.9 kg (143 lb)  BMI 27.7 kg/m2 Estimated body mass index is 27.7 kg/(m^2) as calculated from the following:    Height as of this encounter: 1.53 m (5' 0.25\").    Weight as of this encounter: 64.9 kg (143 lb).  Medication Reconciliation: complete     Cheryl Jacques      "

## 2017-12-20 RX ORDER — ATORVASTATIN CALCIUM 40 MG/1
20 TABLET, FILM COATED ORAL DAILY
Qty: 45 TABLET | Refills: 3 | Status: SHIPPED | OUTPATIENT
Start: 2017-12-20 | End: 2018-12-20

## 2017-12-21 DIAGNOSIS — R73.03 PRE-DIABETES: Primary | ICD-10-CM

## 2017-12-21 LAB
DEPRECATED CALCIDIOL+CALCIFEROL SERPL-MC: <49 UG/L (ref 20–75)
VITAMIN D2 SERPL-MCNC: <5 UG/L
VITAMIN D3 SERPL-MCNC: 44 UG/L

## 2018-03-12 DIAGNOSIS — Z12.11 SCREEN FOR COLON CANCER: ICD-10-CM

## 2018-03-12 PROCEDURE — 82274 ASSAY TEST FOR BLOOD FECAL: CPT | Performed by: PHYSICIAN ASSISTANT

## 2018-03-15 LAB — HEMOCCULT STL QL IA: NEGATIVE

## 2018-10-31 ENCOUNTER — RADIANT APPOINTMENT (OUTPATIENT)
Dept: MAMMOGRAPHY | Facility: CLINIC | Age: 63
End: 2018-10-31
Attending: FAMILY MEDICINE
Payer: COMMERCIAL

## 2018-10-31 DIAGNOSIS — Z12.31 VISIT FOR SCREENING MAMMOGRAM: ICD-10-CM

## 2018-10-31 PROCEDURE — 77067 SCR MAMMO BI INCL CAD: CPT | Performed by: STUDENT IN AN ORGANIZED HEALTH CARE EDUCATION/TRAINING PROGRAM

## 2018-12-20 ENCOUNTER — OFFICE VISIT (OUTPATIENT)
Dept: FAMILY MEDICINE | Facility: CLINIC | Age: 63
End: 2018-12-20
Payer: COMMERCIAL

## 2018-12-20 VITALS
BODY MASS INDEX: 28.76 KG/M2 | TEMPERATURE: 97 F | WEIGHT: 146.5 LBS | OXYGEN SATURATION: 99 % | HEART RATE: 66 BPM | HEIGHT: 60 IN | SYSTOLIC BLOOD PRESSURE: 115 MMHG | DIASTOLIC BLOOD PRESSURE: 74 MMHG

## 2018-12-20 DIAGNOSIS — R73.9 ELEVATED BLOOD SUGAR: Primary | ICD-10-CM

## 2018-12-20 DIAGNOSIS — E78.5 HYPERLIPIDEMIA, UNSPECIFIED HYPERLIPIDEMIA TYPE: ICD-10-CM

## 2018-12-20 DIAGNOSIS — Z13.1 SCREENING FOR DIABETES MELLITUS: ICD-10-CM

## 2018-12-20 DIAGNOSIS — E78.5 HYPERLIPIDEMIA LDL GOAL <130: ICD-10-CM

## 2018-12-20 DIAGNOSIS — E55.9 VITAMIN D DEFICIENCY: ICD-10-CM

## 2018-12-20 DIAGNOSIS — Z00.00 ENCOUNTER FOR ROUTINE ADULT HEALTH EXAMINATION WITHOUT ABNORMAL FINDINGS: Primary | ICD-10-CM

## 2018-12-20 LAB
ALBUMIN SERPL-MCNC: 3.8 G/DL (ref 3.4–5)
ALP SERPL-CCNC: 77 U/L (ref 40–150)
ALT SERPL W P-5'-P-CCNC: 40 U/L (ref 0–50)
ANION GAP SERPL CALCULATED.3IONS-SCNC: 6 MMOL/L (ref 3–14)
AST SERPL W P-5'-P-CCNC: 25 U/L (ref 0–45)
BILIRUB SERPL-MCNC: 0.4 MG/DL (ref 0.2–1.3)
BUN SERPL-MCNC: 12 MG/DL (ref 7–30)
CALCIUM SERPL-MCNC: 9 MG/DL (ref 8.5–10.1)
CHLORIDE SERPL-SCNC: 106 MMOL/L (ref 94–109)
CHOLEST SERPL-MCNC: 165 MG/DL
CO2 SERPL-SCNC: 29 MMOL/L (ref 20–32)
CREAT SERPL-MCNC: 0.65 MG/DL (ref 0.52–1.04)
GFR SERPL CREATININE-BSD FRML MDRD: >90 ML/MIN/{1.73_M2}
GLUCOSE SERPL-MCNC: 127 MG/DL (ref 70–99)
HDLC SERPL-MCNC: 53 MG/DL
LDLC SERPL CALC-MCNC: 75 MG/DL
NONHDLC SERPL-MCNC: 112 MG/DL
POTASSIUM SERPL-SCNC: 4.2 MMOL/L (ref 3.4–5.3)
PROT SERPL-MCNC: 7.9 G/DL (ref 6.8–8.8)
SODIUM SERPL-SCNC: 141 MMOL/L (ref 133–144)
TRIGL SERPL-MCNC: 184 MG/DL

## 2018-12-20 PROCEDURE — 82306 VITAMIN D 25 HYDROXY: CPT | Performed by: PHYSICIAN ASSISTANT

## 2018-12-20 PROCEDURE — 36415 COLL VENOUS BLD VENIPUNCTURE: CPT | Performed by: PHYSICIAN ASSISTANT

## 2018-12-20 PROCEDURE — 80053 COMPREHEN METABOLIC PANEL: CPT | Performed by: PHYSICIAN ASSISTANT

## 2018-12-20 PROCEDURE — 99396 PREV VISIT EST AGE 40-64: CPT | Performed by: PHYSICIAN ASSISTANT

## 2018-12-20 PROCEDURE — 80061 LIPID PANEL: CPT | Performed by: PHYSICIAN ASSISTANT

## 2018-12-20 RX ORDER — ATORVASTATIN CALCIUM 40 MG/1
20 TABLET, FILM COATED ORAL DAILY
Qty: 45 TABLET | Refills: 3 | Status: SHIPPED | OUTPATIENT
Start: 2018-12-20 | End: 2020-01-31

## 2018-12-20 ASSESSMENT — MIFFLIN-ST. JEOR: SCORE: 1148.53

## 2018-12-20 NOTE — PATIENT INSTRUCTIONS
Schedule MA visit for tetanus immunization when well     Preventive Health Recommendations  Female Ages 50 - 64    Yearly exam: See your health care provider every year in order to  o Review health changes.   o Discuss preventive care.    o Review your medicines if your doctor has prescribed any.      Get a Pap test every three years (unless you have an abnormal result and your provider advises testing more often).    If you get Pap tests with HPV test, you only need to test every 5 years, unless you have an abnormal result.     You do not need a Pap test if your uterus was removed (hysterectomy) and you have not had cancer.    You should be tested each year for STDs (sexually transmitted diseases) if you're at risk.     Have a mammogram every 1 to 2 years.    Have a colonoscopy at age 50, or have a yearly FIT test (stool test). These exams screen for colon cancer.      Have a cholesterol test every 5 years, or more often if advised.    Have a diabetes test (fasting glucose) every three years. If you are at risk for diabetes, you should have this test more often.     If you are at risk for osteoporosis (brittle bone disease), think about having a bone density scan (DEXA).    Shots: Get a flu shot each year. Get a tetanus shot every 10 years.    Nutrition:     Eat at least 5 servings of fruits and vegetables each day.    Eat whole-grain bread, whole-wheat pasta and brown rice instead of white grains and rice.    Get adequate Calcium and Vitamin D.     Lifestyle    Exercise at least 150 minutes a week (30 minutes a day, 5 days a week). This will help you control your weight and prevent disease.    Limit alcohol to one drink per day.    No smoking.     Wear sunscreen to prevent skin cancer.     See your dentist every six months for an exam and cleaning.    See your eye doctor every 1 to 2 years.

## 2018-12-20 NOTE — RESULT ENCOUNTER NOTE
Please call and advise patient to come back in another day for fasting labs since blood sugar was high today (rule out diabetes)  Cholesterol looks good. Continue lipitor as has been taking- I will send over another prescription   Liver function, electrolytes and kidney function were normal.   Vitamin D level is still pending

## 2018-12-20 NOTE — PROGRESS NOTES
dwaineali   SUBJECTIVE:   CC: Mercy Pino is an 63 year old woman who presents for preventive health visit.     Healthy Habits:    Do you get at least three servings of calcium containing foods daily (dairy, green leafy vegetables, etc.)? yes    Amount of exercise or daily activities, outside of work: 3 day(s) per week    Problems taking medications regularly No    Medication side effects: No    Have you had an eye exam in the past two years? no    Do you see a dentist twice per year? yes    Do you have sleep apnea, excessive snoring or daytime drowsiness?no      Acute Illness   Acute illness concerns: Cold   Onset: 4 days ago    Fever: no     Chills/Sweats: no     Headache (location?): no     Sinus Pressure:no    Conjunctivitis:  no    Ear Pain: no    Rhinorrhea: YES    Congestion: YES    Sore Throat: no      Cough: YES-non-productive    Wheeze: no     Decreased Appetite: no     Nausea: no     Vomiting: no     Diarrhea:  no     Dysuria/Freq.: no     Fatigue/Achiness: no     Sick/Strep Exposure: no      Therapies Tried and outcome: Nyquil      Today's PHQ-2 Score:   PHQ-2 ( 1999 Pfizer) 12/20/2018 12/19/2017   Q1: Little interest or pleasure in doing things 0 0   Q2: Feeling down, depressed or hopeless 0 0   PHQ-2 Score 0 0       Abuse: Current or Past(Physical, Sexual or Emotional)- No  Do you feel safe in your environment? Yes    Social History     Tobacco Use     Smoking status: Never Smoker     Smokeless tobacco: Never Used     Tobacco comment: smoke free household.   Substance Use Topics     Alcohol use: No     If you drink alcohol do you typically have >3 drinks per day or >7 drinks per week? No                     Reviewed orders with patient.  Reviewed health maintenance and updated orders accordingly - Yes  BP Readings from Last 3 Encounters:   12/20/18 115/74   12/19/17 102/72   03/10/17 116/72    Wt Readings from Last 3 Encounters:   12/20/18 66.5 kg (146 lb 8 oz)   12/19/17 64.9 kg (143 lb)   03/10/17  64.1 kg (141 lb 6.4 oz)                  Patient Active Problem List   Diagnosis     Dermatophytosis of nail     Contact dermatitis and other eczema, due to unspecified cause     Plantar fascial fibromatosis     Family history of diabetes mellitus     HYPERLIPIDEMIA LDL GOAL <130     Pre-diabetes     Cervical polyp     Anxiety     Food allergies     Advanced directives, counseling/discussion     Health Care Home     Osteopenia     Bacterial keratitis, os     Corneal erosion syndrome?     Overweight     Past Surgical History:   Procedure Laterality Date      SECTION      x2     LUMPECTOMY BREAST  age 23    benign       Social History     Tobacco Use     Smoking status: Never Smoker     Smokeless tobacco: Never Used     Tobacco comment: smoke free household.   Substance Use Topics     Alcohol use: No     Family History   Problem Relation Age of Onset     Cancer Mother         pancreatic cancer,  60     Diabetes Mother      Thyroid Disease Mother      Diabetes Maternal Grandmother      Breast Cancer Sister      Thyroid Disease Sister      Asthma No family hx of      C.A.D. No family hx of      Hypertension No family hx of      Cerebrovascular Disease No family hx of      Cancer - colorectal No family hx of      Prostate Cancer No family hx of          Current Outpatient Medications   Medication Sig Dispense Refill     Calcium Citrate-Vitamin D (CALCIUM + D PO)        Cholecalciferol (VITAMIN D3 PO) Take by mouth daily       atorvastatin (LIPITOR) 40 MG tablet Take 0.5 tablets (20 mg) by mouth daily Due for annual office visit prior to further refills 45 tablet 3       Mammogram Screening: Patient over age 50, mutual decision to screen reflected in health maintenance.    Pertinent mammograms are reviewed under the imaging tab.  History of abnormal Pap smear: NO - age 30-65 PAP every 5 years with negative HPV co-testing recommended  PAP / HPV Latest Ref Rng & Units 10/31/2016 2014 2011   PAP - NIL  "NIL NIL   HPV 16 DNA NEG Negative - -   HPV 18 DNA NEG Negative - -   OTHER HR HPV NEG Negative - -     Reviewed and updated as needed this visit by clinical staff  Tobacco  Allergies  Meds  Med Hx  Surg Hx  Fam Hx  Soc Hx        Reviewed and updated as needed this visit by Provider  Tobacco  Allergies  Meds  Problems  Med Hx  Surg Hx  Fam Hx  Soc Hx         Took nyquil last night for cough.  Rhinorrhea, congestion and cough nonproductive. No shortness of breath or chest pain   nyquil last night and dry   If drink lots of water not as congested any more   Works  for office visit- works in [employer]-wears ear plugs  Declines shingles vaccine-   Declines flu vaccine allergy to egg??  Sick once a year -every year - December- is currently on vacation    ROS:  CONSTITUTIONAL: NEGATIVE for fever, chills, change in weight  INTEGUMENTARY/SKIN: NEGATIVE for worrisome rashes, moles or lesions  EYES: NEGATIVE for vision changes or irritation  ENT: as above   RESP:as above  BREAST: NEGATIVE for masses, tenderness or discharge  CV: NEGATIVE for chest pain, palpitations or peripheral edema  GI: NEGATIVE for nausea, abdominal pain, heartburn, or change in bowel habits  : NEGATIVE for unusual urinary or vaginal symptoms. No vaginal bleeding.  MUSCULOSKELETAL: NEGATIVE for significant arthralgias or myalgia  NEURO: NEGATIVE for weakness, dizziness or paresthesias  PSYCHIATRIC: NEGATIVE for changes in mood or affect     OBJECTIVE:   /74 (BP Location: Right arm, Patient Position: Chair, Cuff Size: Adult Regular)   Pulse 66   Temp 97  F (36.1  C) (Tympanic)   Ht 1.52 m (4' 11.84\")   Wt 66.5 kg (146 lb 8 oz)   SpO2 99%   BMI 28.76 kg/m    EXAM:  GENERAL APPEARANCE: healthy, alert and no distress  EYES: Eyes grossly normal to inspection, PERRL and conjunctivae and sclerae normal  HENT: ear canals and TM's normal, nose and mouth without ulcers or lesions, oropharynx clear and oral mucous membranes " moist  NECK: no adenopathy, no asymmetry, masses, or scars and thyroid normal to palpation  RESP: lungs clear to auscultation - no rales, rhonchi or wheezes  BREAST: normal without masses, tenderness or nipple discharge and no palpable axillary masses or adenopathy  CV: regular rate and rhythm, normal S1 S2, no S3 or S4, no murmur, click or rub, no peripheral edema and peripheral pulses strong  ABDOMEN: soft, nontender, no hepatosplenomegaly, no masses and bowel sounds normal   (female): normal female external genitalia, normal urethral meatus, vaginal mucosal atrophy noted, normal cervix, adnexae, and uterus without masses or abnormal discharge  MS: no musculoskeletal defects are noted and gait is age appropriate without ataxia  SKIN: no suspicious lesions or rashes  NEURO: Normal strength and tone, sensory exam grossly normal, mentation intact and speech normal  PSYCH: mentation appears normal and affect normal/bright    Diagnostic Test Results:  none     ASSESSMENT/PLAN:   1. Encounter for routine adult health examination without abnormal findings  Will come in for TDAP when feeling better  Declines influenza and shingles vaccines    2. Hyperlipidemia LDL goal <130  Will send over new prescription when labs are back for lipitor  - Lipid panel reflex to direct LDL Fasting  - Comprehensive metabolic panel    3. Screening for diabetes mellitus    - Comprehensive metabolic panel    4. Vitamin D deficiency    - 25 Hydroxyvitamin D2 and D3    COUNSELING:   Reviewed preventive health counseling, as reflected in patient instructions       Regular exercise       Healthy diet/nutrition       Immunizations    Declined: Influenza and Zoster due to Concerns about side effects/safety               Osteoporosis Prevention/Bone Health       Colon cancer screening    BP Readings from Last 1 Encounters:   12/20/18 115/74     Estimated body mass index is 28.76 kg/m  as calculated from the following:    Height as of this  "encounter: 1.52 m (4' 11.84\").    Weight as of this encounter: 66.5 kg (146 lb 8 oz).      Weight management plan: Discussed healthy diet and exercise guidelines     reports that  has never smoked. she has never used smokeless tobacco.      Counseling Resources:  ATP IV Guidelines  Pooled Cohorts Equation Calculator  Breast Cancer Risk Calculator  FRAX Risk Assessment  ICSI Preventive Guidelines  Dietary Guidelines for Americans, 2010  USDA's MyPlate  ASA Prophylaxis  Lung CA Screening    Nancy Carver PA-C  [facility]  "

## 2018-12-24 LAB
DEPRECATED CALCIDIOL+CALCIFEROL SERPL-MC: <53 UG/L (ref 20–75)
VITAMIN D2 SERPL-MCNC: <5 UG/L
VITAMIN D3 SERPL-MCNC: 48 UG/L

## 2018-12-28 DIAGNOSIS — E11.9 TYPE 2 DIABETES MELLITUS WITHOUT COMPLICATION, WITHOUT LONG-TERM CURRENT USE OF INSULIN (H): Primary | ICD-10-CM

## 2018-12-28 DIAGNOSIS — R73.9 ELEVATED BLOOD SUGAR: ICD-10-CM

## 2018-12-28 LAB
GLUCOSE SERPL-MCNC: 138 MG/DL (ref 70–99)
HBA1C MFR BLD: 6.3 % (ref 0–5.6)

## 2018-12-28 PROCEDURE — 82947 ASSAY GLUCOSE BLOOD QUANT: CPT | Performed by: PHYSICIAN ASSISTANT

## 2018-12-28 PROCEDURE — 36415 COLL VENOUS BLD VENIPUNCTURE: CPT | Performed by: PHYSICIAN ASSISTANT

## 2018-12-28 PROCEDURE — 83036 HEMOGLOBIN GLYCOSYLATED A1C: CPT | Performed by: PHYSICIAN ASSISTANT

## 2018-12-28 NOTE — PROGRESS NOTES
Called patient to review lab results.  Has had more than 2 fasting blood sugars above 125 but A1C in prediabetes range.  She did have gestational diabetes controlled with diet.   Is not interested in starting med at this time.  Is motivated to lose weight and improve diet and exercise. Encouraged to schedule appointment with diabetes educator and follow up with me in 3 months.

## 2019-03-11 ENCOUNTER — ALLIED HEALTH/NURSE VISIT (OUTPATIENT)
Dept: EDUCATION SERVICES | Facility: CLINIC | Age: 64
End: 2019-03-11
Payer: COMMERCIAL

## 2019-03-11 VITALS — WEIGHT: 142.2 LBS | BODY MASS INDEX: 26.85 KG/M2 | HEIGHT: 61 IN

## 2019-03-11 DIAGNOSIS — E11.9 TYPE 2 DIABETES MELLITUS WITHOUT COMPLICATION, WITHOUT LONG-TERM CURRENT USE OF INSULIN (H): Primary | ICD-10-CM

## 2019-03-11 PROCEDURE — G0108 DIAB MANAGE TRN  PER INDIV: HCPCS

## 2019-03-11 RX ORDER — MULTIPLE VITAMINS W/ MINERALS TAB 9MG-400MCG
1 TAB ORAL DAILY
COMMUNITY

## 2019-03-11 RX ORDER — BLOOD-GLUCOSE METER
1 EACH MISCELLANEOUS DAILY
Qty: 1 KIT | Refills: 0 | COMMUNITY
Start: 2019-03-11

## 2019-03-11 RX ORDER — OMEGA-3/DHA/EPA/FISH OIL 60 MG-90MG
CAPSULE ORAL
COMMUNITY

## 2019-03-11 ASSESSMENT — MIFFLIN-ST. JEOR: SCORE: 1139.45

## 2019-03-11 NOTE — Clinical Note
Eric, I met with Mercy Pino for a diabetes education visit today. Ongoing plan for education and support: Follow-up visit with diabetes educator in 1 month. If there are any questions or recommended changes to the patient's diabetes care plan in the future, I will be in touch. Thank you for your referral! (This communication is being sent to you in order to comply with our American Association of Diabetes Educators accreditation standards.)Marta Lane RD, LD, CDE

## 2019-03-11 NOTE — PROGRESS NOTES
"Diabetes Self-Management Education & Support    Diabetes Education Self Management & Training    SUBJECTIVE/OBJECTIVE:  Presents for: Initial Assessment for new diagnosis  Accompanied by: Self  Diabetes education in the past 24mo: No  Focus of Visit: Assistance w/ making life changes  Diabetes type: Type 2  Date of diagnosis: 12/28/18  How confident are you filling out medical forms by yourself:: Extremely  Diabetes management related comments/concerns: Says when pregnant 23 years ago, had Gestational diabetes.  Mom has diabetes but she passed away. Says past 3 years not as active at work.  Starting this spring wants to walk more and get more active.     Says this past year was very stressful- sold old house and bought a new one and then the holidays approached and had more parties.     Says made changes to cut back on rice and stopped eating chocolate. Reduced carbohydrate and increased more veggies. Says eats a lot of pomegranate and drinking the juice- not sure if this is contributing.  Says eats a lot all day - food is a comfort.   works overnight and just with her son.    Transportation concerns: No  Other concerns:: None  Cultural Influences/Ethnic Background:  [place]    Diabetes Symptoms & Complications  Blurred vision: No  Fatigue: No  Neuropathy: No  Foot ulcerations: No  Polydipsia: No  Polyphagia: No  Polyuria: No  Visual change: No  Weakness: No  Weight loss: No  Slow healing wounds: No  Recent Infection(s): No  Symptom course: Stable  Weight trend: Decreasing steadily  Autonomic neuropathy: No  CVA: No  Heart disease: No  Nephropathy: No  Peripheral neuropathy: No  Peripheral Vascular Disease: No  Retinopathy: No  Sexual dysfunction: No    Patient Problem List and Family Medical History reviewed for relevant medical history, current medical status, and diabetes risk factors.    Vitals:  Ht 1.537 m (5' 0.5\")   Wt 64.5 kg (142 lb 3.2 oz)   BMI 27.31 kg/m    Estimated body mass index is 27.31 " "kg/m  as calculated from the following:    Height as of this encounter: 1.537 m (5' 0.5\").    Weight as of this encounter: 64.5 kg (142 lb 3.2 oz).   Last 3 BP:   BP Readings from Last 3 Encounters:   12/20/18 115/74   12/19/17 102/72   03/10/17 116/72       History   Smoking Status     Never Smoker   Smokeless Tobacco     Never Used     Comment: smoke free household.       Labs:  Lab Results   Component Value Date    A1C 6.3 12/28/2018     Lab Results   Component Value Date     12/28/2018     Lab Results   Component Value Date    LDL 75 12/20/2018     HDL Cholesterol   Date Value Ref Range Status   12/20/2018 53 >49 mg/dL Final   ]  GFR Estimate   Date Value Ref Range Status   12/20/2018 >90 >60 mL/min/[1.73_m2] Final     Comment:     Non  GFR Calc  Starting 12/18/2018, serum creatinine based estimated GFR (eGFR) will be   calculated using the Chronic Kidney Disease Epidemiology Collaboration   (CKD-EPI) equation.       GFR Estimate If Black   Date Value Ref Range Status   12/20/2018 >90 >60 mL/min/[1.73_m2] Final     Comment:      GFR Calc  Starting 12/18/2018, serum creatinine based estimated GFR (eGFR) will be   calculated using the Chronic Kidney Disease Epidemiology Collaboration   (CKD-EPI) equation.       Lab Results   Component Value Date    CR 0.65 12/20/2018     No results found for: MICROALBUMIN    Healthy Eating  Healthy Eating Assessed Today: Yes  Cultural/Druze diet restrictions?: No  Patient on a regular basis: Eats 3 meals a day  Meal planning: Smaller portions  Meals include: Breakfast, Lunch, Dinner, Snacks  Breakfast: English muffin with PB and strawberry and green tea(Wake: 9:30-10am.  B: 10:30am)  Lunch: sandwich OR 1 cup pasta (lasagna) with yolis and water (L: 4:30pm (works at 1pm))  Dinner: salad OR cabbage soup.  Prior to January larger portions rice and snacks(10pm: )  Snacks: AM: none, PM: cucumber and pistachios OR none  Other: banana OR " yolis OR apple  Beverages: Tea, Water  Has patient met with a dietitian in the past?: No    Being Active  Being Active Assessed Today: Yes  Exercise:: Currently not exercising  Barrier to exercise: Safety(weather )    Monitoring  Monitoring Assessed Today: Yes  Did patient bring glucose meter to appointment? : No  Blood Glucose Meter: ContourNext(One)  Home Glucose (Sugar) Monitoring: Never      Taking Medications      Taking Medication Assessed Today: Yes  Current Treatments: Diet    Problem Solving  Problem Solving Assessed Today: No              Reducing Risks  Reducing Risks Assessed Today: No    Healthy Coping  Healthy Coping Assessed Today: No  Patient Activation Measure Survey Score:  FITO Score (Last Two) 4/28/2011 2/23/2012   FITO Raw Score 40 39   Activation Score 60 56.4   FITO Level 3 3       ASSESSMENT:  Discussed physiology of diabetes, alternating checking times to help identify patterns, how often to check blood glucose, what the A1C means and reviewed target blood glucose levels. Reviewed benefits of exercise to help with better blood glucose utilization and encouraged her to increase as tolerated.    Reviewed carbohydrate sources and instructed on carbohydrate counting and label reading.  Provided general recommendations for her to consume 45-60 gm carbs at meals and limit snacks to 15-30 grams.  Commended Mercy on making changes to cut out sweetes and sweetened beverages and cut back on portions but cautioned against under-consuming carbs since may contribute to her hunger/cravings after dinner; says usually only eats veggies for dinenr but always hungry after dinner so suggested including some carbs at this meal through rice or fruit to see if it helps. Used food models to help demonstrate portion control.  Patient to receive additional education when returns for follow up.  Pt seems receptive and verbalized understanding of concepts discussed and recommendations provided.          Goals  Addressed as of 3/11/2019 at 10:13 AM       Healthy Eating (pt-stated)     Added 3/11/19 by Marta Lane RD     My Goal: I will aim for 45 gm carbohydrates at meals and limit snacks 15-30 gm    What I need to meet my goal: try meal recording if struggling    I plan to meet my goal by this date: follow up            Patient's most recent   Lab Results   Component Value Date    A1C 6.3 12/28/2018    is meeting goal of <7.0    INTERVENTION:   Diabetes knowledge and skills assessment:     Patient is knowledgeable in diabetes management concepts related to: Healthy Eating    Patient needs further education on the following diabetes management concepts: Healthy Eating, Being Active, Monitoring, Taking Medication, Problem Solving, Reducing Risks and Healthy Coping    Based on learning assessment above, most appropriate setting for further diabetes education would be: Group class or Individual setting.    Education provided today on:  AADE Self-Care Behaviors:  Diabetes Pathophysiology  Healthy Eating: carbohydrate counting, consistency in amount, composition, and timing of food intake, weight reduction, portion control and label reading  Being Active: relationship to blood glucose and describe appropriate activity program  Monitoring: purpose, proper technique, log and interpret results, individual blood glucose targets, frequency of monitoring and proper sharps disposal  Patient was instructed on Contour Next One meter and was able to provide an accurate return demonstration. Patient's blood glucose reading today was 128 mg/dL, 1.5 hours after eating breakfast.    Opportunities for ongoing education and support in diabetes-self management were discussed.    Pt verbalized understanding of concepts discussed and recommendations provided today.       Education Materials Provided:  Pari Understanding Diabetes Booklet, Safe Disposal Options for Needles & Syringes, BG Log Sheet and Contour Next One meter  kit    PLAN:  See Patient Instructions for co-developed, patient-stated behavior change goals.   AVS printed and provided to patient today. See Follow-Up section for recommended follow-up.  FOLLOW UP: 4/11/19 at [place] since closer for her.  Review blood glucose and finish AADE7 on  heart-healthy eating, self-care, risk reduction, problem solving, healthy coping and resources    Marta Lane RD, LD, CDE   Time Spent: 65 minutes  Encounter Type: Individual    Any diabetes medication dose changes were made via the CDE Protocol and Collaborative Practice Agreement with the patient's referring provider. A copy of this encounter was shared with the provider.

## 2019-03-11 NOTE — PATIENT INSTRUCTIONS
1. Check blood glucose 2 times a week, varying times before meals.    2. Watch carbohydrate intake at meals.    3. Keep working to increase activity - will help lower blood glucose.     FOLLOW UP: Thursday, April 11th at 8:30am at [facility]    Marta Lane RD, LD, CDE   [phone]

## 2019-03-11 NOTE — Clinical Note
Bakari Bocanegra,This patient will be seeing you on 4/11 to try to finish her diabetes education since closer to home for her.Thanks,Marta Lane RD, LD, CDE

## 2019-04-11 ENCOUNTER — TELEPHONE (OUTPATIENT)
Dept: FAMILY MEDICINE | Facility: CLINIC | Age: 64
End: 2019-04-11

## 2019-04-11 DIAGNOSIS — E11.9 TYPE 2 DIABETES MELLITUS WITHOUT COMPLICATION, WITHOUT LONG-TERM CURRENT USE OF INSULIN (H): Primary | ICD-10-CM

## 2019-04-11 RX ORDER — GLUCOSAMINE HCL/CHONDROITIN SU 500-400 MG
CAPSULE ORAL
Qty: 100 EACH | Refills: 3 | Status: SHIPPED | OUTPATIENT
Start: 2019-04-11

## 2019-04-11 RX ORDER — LANCETS
EACH MISCELLANEOUS
Qty: 100 EACH | Refills: 6 | Status: SHIPPED | OUTPATIENT
Start: 2019-04-11 | End: 2020-01-31

## 2019-04-11 NOTE — TELEPHONE ENCOUNTER
Message from [facility] [phone]    Patient requests new RX for Accu-Chek Guide Meter due to Ins preference.    Alecia James

## 2019-11-01 ENCOUNTER — ANCILLARY PROCEDURE (OUTPATIENT)
Dept: MAMMOGRAPHY | Facility: CLINIC | Age: 64
End: 2019-11-01
Attending: FAMILY MEDICINE
Payer: COMMERCIAL

## 2019-11-01 DIAGNOSIS — Z12.31 SCREENING MAMMOGRAM, ENCOUNTER FOR: ICD-10-CM

## 2019-11-01 PROCEDURE — 77067 SCR MAMMO BI INCL CAD: CPT | Performed by: RADIOLOGY

## 2020-01-31 ENCOUNTER — OFFICE VISIT (OUTPATIENT)
Dept: FAMILY MEDICINE | Facility: CLINIC | Age: 65
End: 2020-01-31
Payer: COMMERCIAL

## 2020-01-31 VITALS
TEMPERATURE: 97.8 F | HEART RATE: 67 BPM | BODY MASS INDEX: 26.24 KG/M2 | RESPIRATION RATE: 16 BRPM | DIASTOLIC BLOOD PRESSURE: 73 MMHG | HEIGHT: 61 IN | SYSTOLIC BLOOD PRESSURE: 121 MMHG | OXYGEN SATURATION: 100 % | WEIGHT: 139 LBS

## 2020-01-31 DIAGNOSIS — Z13.21 ENCOUNTER FOR VITAMIN DEFICIENCY SCREENING: ICD-10-CM

## 2020-01-31 DIAGNOSIS — E78.5 HYPERLIPIDEMIA, UNSPECIFIED HYPERLIPIDEMIA TYPE: ICD-10-CM

## 2020-01-31 DIAGNOSIS — E11.9 TYPE 2 DIABETES MELLITUS WITHOUT COMPLICATION, WITHOUT LONG-TERM CURRENT USE OF INSULIN (H): ICD-10-CM

## 2020-01-31 DIAGNOSIS — Z12.11 SCREENING FOR COLON CANCER: ICD-10-CM

## 2020-01-31 DIAGNOSIS — Z00.00 ROUTINE GENERAL MEDICAL EXAMINATION AT A HEALTH CARE FACILITY: Primary | ICD-10-CM

## 2020-01-31 LAB
ALBUMIN SERPL-MCNC: 3.8 G/DL (ref 3.4–5)
ALP SERPL-CCNC: 68 U/L (ref 40–150)
ALT SERPL W P-5'-P-CCNC: 38 U/L (ref 0–50)
ANION GAP SERPL CALCULATED.3IONS-SCNC: 3 MMOL/L (ref 3–14)
AST SERPL W P-5'-P-CCNC: 20 U/L (ref 0–45)
BILIRUB SERPL-MCNC: 0.5 MG/DL (ref 0.2–1.3)
BUN SERPL-MCNC: 19 MG/DL (ref 7–30)
CALCIUM SERPL-MCNC: 8.9 MG/DL (ref 8.5–10.1)
CHLORIDE SERPL-SCNC: 109 MMOL/L (ref 94–109)
CHOLEST SERPL-MCNC: 162 MG/DL
CO2 SERPL-SCNC: 29 MMOL/L (ref 20–32)
CREAT SERPL-MCNC: 0.6 MG/DL (ref 0.52–1.04)
CREAT UR-MCNC: 106 MG/DL
GFR SERPL CREATININE-BSD FRML MDRD: >90 ML/MIN/{1.73_M2}
GLUCOSE SERPL-MCNC: 132 MG/DL (ref 70–99)
HBA1C MFR BLD: 6.1 % (ref 0–5.6)
HDLC SERPL-MCNC: 59 MG/DL
LDLC SERPL CALC-MCNC: 84 MG/DL
MICROALBUMIN UR-MCNC: 10 MG/L
MICROALBUMIN/CREAT UR: 9.62 MG/G CR (ref 0–25)
NONHDLC SERPL-MCNC: 103 MG/DL
POTASSIUM SERPL-SCNC: 4.1 MMOL/L (ref 3.4–5.3)
PROT SERPL-MCNC: 7.5 G/DL (ref 6.8–8.8)
SODIUM SERPL-SCNC: 141 MMOL/L (ref 133–144)
TRIGL SERPL-MCNC: 96 MG/DL
TSH SERPL DL<=0.005 MIU/L-ACNC: 1.76 MU/L (ref 0.4–4)

## 2020-01-31 PROCEDURE — 84443 ASSAY THYROID STIM HORMONE: CPT | Performed by: PHYSICIAN ASSISTANT

## 2020-01-31 PROCEDURE — 36415 COLL VENOUS BLD VENIPUNCTURE: CPT | Performed by: PHYSICIAN ASSISTANT

## 2020-01-31 PROCEDURE — 82306 VITAMIN D 25 HYDROXY: CPT | Performed by: PHYSICIAN ASSISTANT

## 2020-01-31 PROCEDURE — 82043 UR ALBUMIN QUANTITATIVE: CPT | Performed by: PHYSICIAN ASSISTANT

## 2020-01-31 PROCEDURE — 80053 COMPREHEN METABOLIC PANEL: CPT | Performed by: PHYSICIAN ASSISTANT

## 2020-01-31 PROCEDURE — 99396 PREV VISIT EST AGE 40-64: CPT | Performed by: PHYSICIAN ASSISTANT

## 2020-01-31 PROCEDURE — 83036 HEMOGLOBIN GLYCOSYLATED A1C: CPT | Performed by: PHYSICIAN ASSISTANT

## 2020-01-31 PROCEDURE — 80061 LIPID PANEL: CPT | Performed by: PHYSICIAN ASSISTANT

## 2020-01-31 RX ORDER — ATORVASTATIN CALCIUM 40 MG/1
20 TABLET, FILM COATED ORAL DAILY
Qty: 45 TABLET | Refills: 3 | Status: SHIPPED | OUTPATIENT
Start: 2020-01-31 | End: 2021-02-08

## 2020-01-31 ASSESSMENT — MIFFLIN-ST. JEOR: SCORE: 1119.94

## 2020-01-31 ASSESSMENT — PAIN SCALES - GENERAL: PAINLEVEL: MILD PAIN (3)

## 2020-01-31 NOTE — LETTER
[address]  [address]  [phone]    February 4, 2020      Mercy Pino  [address] [place] [address]      Dear Mercy   Your vitamin D level was in the normal range.   Continue supplements as you have been taking.   Please call or MyChart my office with any questions or concerns.     Nancy Carver, PAC

## 2020-01-31 NOTE — PROGRESS NOTES
SUBJECTIVE:   CC: Mercy Pino is an 64 year old woman who presents for preventive health visit.     Healthy Habits:    Do you get at least three servings of calcium containing foods daily (dairy, green leafy vegetables, etc.)? yes    Amount of exercise or daily activities, outside of work: 7 day(s) per week    Problems taking medications regularly No    Medication side effects: No    Have you had an eye exam in the past two years? yes    Do you see a dentist twice per year? yes    Do you have sleep apnea, excessive snoring or daytime drowsiness?no          Today's PHQ-2 Score:   PHQ-2 ( 1999 [employer]) 1/31/2020 12/20/2018   Q1: Little interest or pleasure in doing things 0 0   Q2: Feeling down, depressed or hopeless 0 0   PHQ-2 Score 0 0       Abuse: Current or Past(Physical, Sexual or Emotional)- No  Do you feel safe in your environment? Yes    Have you ever done Advance Care Planning? (For example, a Health Directive, POLST, or a discussion with a medical provider or your loved ones about your wishes): No, advance care planning information given to patient to review.  Patient plans to discuss their wishes with loved ones or provider.      Social History     Tobacco Use     Smoking status: Never Smoker     Smokeless tobacco: Never Used     Tobacco comment: smoke free household.   Substance Use Topics     Alcohol use: No     If you drink alcohol do you typically have >3 drinks per day or >7 drinks per week? No                     Reviewed orders with patient.  Reviewed health maintenance and updated orders accordingly - Yes  BP Readings from Last 3 Encounters:   01/31/20 121/73   12/20/18 115/74   12/19/17 102/72    Wt Readings from Last 3 Encounters:   01/31/20 63 kg (139 lb)   03/11/19 64.5 kg (142 lb 3.2 oz)   12/20/18 66.5 kg (146 lb 8 oz)                  Patient Active Problem List   Diagnosis     Dermatophytosis of nail     Contact dermatitis and other eczema, due to unspecified cause     Plantar  fascial fibromatosis     Family history of diabetes mellitus     HYPERLIPIDEMIA LDL GOAL <130     Pre-diabetes     Cervical polyp     Anxiety     Food allergies     Advanced directives, counseling/discussion     Health Care Home     Osteopenia     Bacterial keratitis, os     Corneal erosion syndrome?     Overweight     Type 2 diabetes mellitus without complication, without long-term current use of insulin (H)     Past Surgical History:   Procedure Laterality Date      SECTION      x2     LUMPECTOMY BREAST  age 23    benign       Social History     Tobacco Use     Smoking status: Never Smoker     Smokeless tobacco: Never Used     Tobacco comment: smoke free household.   Substance Use Topics     Alcohol use: No     Family History   Problem Relation Age of Onset     Cancer Mother         pancreatic cancer,  60     Diabetes Mother      Thyroid Disease Mother      Diabetes Maternal Grandmother      Breast Cancer Sister      Thyroid Disease Sister      Asthma No family hx of      C.A.D. No family hx of      Hypertension No family hx of      Cerebrovascular Disease No family hx of      Cancer - colorectal No family hx of      Prostate Cancer No family hx of          Current Outpatient Medications   Medication Sig Dispense Refill     alcohol swab prep pads Use to swab area of injection/melody as directed. 100 each 3     atorvastatin (LIPITOR) 40 MG tablet Take 0.5 tablets (20 mg) by mouth daily Due for annual office visit prior to further refills 45 tablet 3     blood glucose (NO BRAND SPECIFIED) test strip Use to test blood sugar 1 times daily or as directed. To accompany: Blood Glucose Monitor Brands: per insurance. 100 strip 6     blood glucose calibration (NO BRAND SPECIFIED) solution To accompany: Blood Glucose Monitor Brands: per insurance. 1 Bottle 3     blood glucose monitoring (FATOU MICROLET) lancets Use to test blood sugar 1 times daily 100 each 3     blood glucose monitoring (NO BRAND SPECIFIED) meter  device kit Use to test blood sugar 1 times daily or as directed. Preferred blood glucose meter OR supplies to accompany: Blood Glucose Monitor Brands: per insurance. 1 kit 0     Blood Glucose Monitoring Suppl (CONTOUR NEXT ONE) KIT 1 Device daily 1 kit 0     Calcium Citrate-Vitamin D (CALCIUM + D PO)        Cholecalciferol (VITAMIN D3 PO) Take by mouth daily       Glucos-MSM-C-Rv-Gumhdj-Pblzob (GLUCOSAMINE MSM COMPLEX) TABS tablet Take 1 tablet by mouth daily       multivitamin w/minerals (MULTI-VITAMIN) tablet Take 1 tablet by mouth daily       Omega-3 Fatty Acids (FISH OIL) 500 MG CAPS          Mammogram Screening: Patient over age 50, mutual decision to screen reflected in health maintenance.    Pertinent mammograms are reviewed under the imaging tab.  History of abnormal Pap smear: NO - age 30-65 PAP every 5 years with negative HPV co-testing recommended  PAP / HPV Latest Ref Rng & Units 10/31/2016 5/5/2014 4/28/2011   PAP - NIL NIL NIL   HPV 16 DNA NEG Negative - -   HPV 18 DNA NEG Negative - -   OTHER HR HPV NEG Negative - -     Reviewed and updated as needed this visit by clinical staff  Tobacco  Allergies  Meds  Med Hx  Surg Hx  Fam Hx  Soc Hx        Reviewed and updated as needed this visit by Provider  Tobacco  Allergies  Meds  Problems  Med Hx  Surg Hx  Fam Hx          Works in [employer] - computer and walking - busy  Fasting today  Sometimes checking blood sugar not high   Quick to lose weight  Damien Luna- always have free foods at work from various businesses - hard to control diet  ROS:  CONSTITUTIONAL: NEGATIVE for fever, chills, change in weight  INTEGUMENTARY/SKIN: NEGATIVE for worrisome rashes, moles or lesions  EYES: NEGATIVE for vision changes or irritation  ENT: NEGATIVE for ear, mouth and throat problems  RESP: NEGATIVE for significant cough or SOB  BREAST: NEGATIVE for masses, tenderness or discharge  CV: NEGATIVE for chest pain, palpitations or peripheral edema  GI:  "NEGATIVE for nausea, abdominal pain, heartburn, or change in bowel habits  : NEGATIVE for unusual urinary or vaginal symptoms. No vaginal bleeding.  MUSCULOSKELETAL: NEGATIVE for significant arthralgias or myalgia  NEURO: NEGATIVE for weakness, dizziness or paresthesias  PSYCHIATRIC: NEGATIVE for changes in mood or affect     OBJECTIVE:   /73 (BP Location: Right arm, Patient Position: Chair, Cuff Size: Adult Regular)   Pulse 67   Temp 97.8  F (36.6  C) (Oral)   Resp 16   Ht 1.537 m (5' 0.5\")   Wt 63 kg (139 lb)   LMP  (Exact Date)   SpO2 100%   Breastfeeding No   BMI 26.70 kg/m    EXAM:  GENERAL APPEARANCE: healthy, alert and no distress  EYES: Eyes grossly normal to inspection, PERRL and conjunctivae and sclerae normal  HENT: ear canals and TM's normal, nose and mouth without ulcers or lesions, oropharynx clear and oral mucous membranes moist  NECK: no adenopathy, no asymmetry, masses, or scars and thyroid normal to palpation  RESP: lungs clear to auscultation - no rales, rhonchi or wheezes  BREAST: normal without masses, tenderness or nipple discharge and no palpable axillary masses or adenopathy  CV: regular rate and rhythm, normal S1 S2, no S3 or S4, no murmur, click or rub, no peripheral edema and peripheral pulses strong  ABDOMEN: soft, nontender, no hepatosplenomegaly, no masses and bowel sounds normal   (female): normal female external genitalia, normal urethral meatus, vaginal mucosal atrophy noted, normal cervix, adnexae, and uterus without masses or abnormal discharge  MS: no musculoskeletal defects are noted and gait is age appropriate without ataxia  SKIN: no suspicious lesions or rashes  NEURO: Normal strength and tone, sensory exam grossly normal, mentation intact and speech normal  PSYCH: mentation appears normal and affect normal/bright    Diagnostic Test Results:  Results for orders placed or performed in visit on 01/31/20   Albumin Random Urine Quantitative with Creat Ratio     " Status: None   Result Value Ref Range    Creatinine Urine 106 mg/dL    Albumin Urine mg/L 10 mg/L    Albumin Urine mg/g Cr 9.62 0 - 25 mg/g Cr   TSH WITH FREE T4 REFLEX     Status: None   Result Value Ref Range    TSH 1.76 0.40 - 4.00 mU/L   HEMOGLOBIN A1C     Status: Abnormal   Result Value Ref Range    Hemoglobin A1C 6.1 (H) 0 - 5.6 %   Lipid panel reflex to direct LDL Fasting     Status: None   Result Value Ref Range    Cholesterol 162 <200 mg/dL    Triglycerides 96 <150 mg/dL    HDL Cholesterol 59 >49 mg/dL    LDL Cholesterol Calculated 84 <100 mg/dL    Non HDL Cholesterol 103 <130 mg/dL   Comprehensive metabolic panel     Status: Abnormal   Result Value Ref Range    Sodium 141 133 - 144 mmol/L    Potassium 4.1 3.4 - 5.3 mmol/L    Chloride 109 94 - 109 mmol/L    Carbon Dioxide 29 20 - 32 mmol/L    Anion Gap 3 3 - 14 mmol/L    Glucose 132 (H) 70 - 99 mg/dL    Urea Nitrogen 19 7 - 30 mg/dL    Creatinine 0.60 0.52 - 1.04 mg/dL    GFR Estimate >90 >60 mL/min/[1.73_m2]    GFR Estimate If Black >90 >60 mL/min/[1.73_m2]    Calcium 8.9 8.5 - 10.1 mg/dL    Bilirubin Total 0.5 0.2 - 1.3 mg/dL    Albumin 3.8 3.4 - 5.0 g/dL    Protein Total 7.5 6.8 - 8.8 g/dL    Alkaline Phosphatase 68 40 - 150 U/L    ALT 38 0 - 50 U/L    AST 20 0 - 45 U/L       ASSESSMENT/PLAN:   1. Routine general medical examination at a health care facility    - TSH WITH FREE T4 REFLEX  - HEMOGLOBIN A1C  - Lipid panel reflex to direct LDL Fasting  - Comprehensive metabolic panel  - 25 Hydroxyvitamin D2 and D3    2. Hyperlipidemia, unspecified hyperlipidemia type  Lipids well controlled with lipitor- follow up with us in one year   - atorvastatin (LIPITOR) 40 MG tablet; Take 0.5 tablets (20 mg) by mouth daily Due for annual office visit prior to further refills  Dispense: 45 tablet; Refill: 3  - Comprehensive metabolic panel    3. Type 2 diabetes mellitus without complication, without long-term current use of insulin (H)  diabetes well controlled with  "A1c of 6.1  - Albumin Random Urine Quantitative with Creat Ratio  - TSH WITH FREE T4 REFLEX  - HEMOGLOBIN A1C  - Lipid panel reflex to direct LDL Fasting  - FOOT EXAM  - Comprehensive metabolic panel    4. Encounter for vitamin deficiency screening    - Comprehensive metabolic panel  - 25 Hydroxyvitamin D2 and D3    5. Screening for colon cancer  Encouraged to bring in FIT card- declines colonoscopy   - Fecal colorectal cancer screen FIT; Future    COUNSELING:   Reviewed preventive health counseling, as reflected in patient instructions       Regular exercise       Healthy diet/nutrition       Vision screening       Immunizations    Declined: Influenza, Pneumococcal and Zoster due to Conscientious objector               Osteoporosis Prevention/Bone Health       Colon cancer screening    Estimated body mass index is 26.7 kg/m  as calculated from the following:    Height as of this encounter: 1.537 m (5' 0.5\").    Weight as of this encounter: 63 kg (139 lb).    Weight management plan: Discussed healthy diet and exercise guidelines     reports that she has never smoked. She has never used smokeless tobacco.      Counseling Resources:  ATP IV Guidelines  Pooled Cohorts Equation Calculator  Breast Cancer Risk Calculator  FRAX Risk Assessment  ICSI Preventive Guidelines  Dietary Guidelines for Americans, 2010  USDA's MyPlate  ASA Prophylaxis  Lung CA Screening    Nancy Carver PA-C  [facility]  "

## 2020-01-31 NOTE — PATIENT INSTRUCTIONS
I will send a letter with lab results if normal   Bring in FIT test for colon cancer screening   Follow up with us in 6 months.  Work on diet     Patient Education     At [facility], we strive to deliver an exceptional experience to you, every time we see you. If you receive a survey, please complete it as we do value your feedback.  If you have MyChart, you can expect to receive results automatically within 24 hours of their completion.  Your provider will send a note interpreting your results as well.   If you do not have MyChart, you should receive your results in about a week by mail.    Your care team:     Family Medicine   ESTHER Ramirez MD Emily Bunt, APRN CNP S. MD Praveena Gallardo MD Angela Wermerskirchen, MD    Internal Medicine  Sathya Norman MD coming 2020     Clinic hours: Monday - Wednesday 7 am-7 pm   Thursdays and Fridays 7 am-5 pm.     [facility]: Monday - Friday 11 am-9 pm,   Saturday and Sunday 9 am-5 pm.    [facility]: Monday -Thursday 8 am-8 pm; Friday 8 am-6 pm; Saturday and Sunday 9 am-5 pm.     [facility]: Monday - Thursday 8 am - 7 pm; Friday 8 am - 6 pm    Clinic: [phone]   [facility]: [phone]   [facility]: [phone]            Preventive Health Recommendations  Female Ages 50 - 64    Yearly exam: See your health care provider every year in order to  o Review health changes.   o Discuss preventive care.    o Review your medicines if your doctor has prescribed any.      Get a Pap test every three years (unless you have an abnormal result and your provider advises testing more often).    If you get Pap tests with HPV test, you only need to test every 5 years, unless you have an abnormal result.     You do not need a Pap test if your uterus was removed (hysterectomy) and you have not had  cancer.    You should be tested each year for STDs (sexually transmitted diseases) if you're at risk.     Have a mammogram every 1 to 2 years.    Have a colonoscopy at age 50, or have a yearly FIT test (stool test). These exams screen for colon cancer.      Have a cholesterol test every 5 years, or more often if advised.    Have a diabetes test (fasting glucose) every three years. If you are at risk for diabetes, you should have this test more often.     If you are at risk for osteoporosis (brittle bone disease), think about having a bone density scan (DEXA).    Shots: Get a flu shot each year. Get a tetanus shot every 10 years.    Nutrition:     Eat at least 5 servings of fruits and vegetables each day.    Eat whole-grain bread, whole-wheat pasta and brown rice instead of white grains and rice.    Get adequate Calcium and Vitamin D.     Lifestyle    Exercise at least 150 minutes a week (30 minutes a day, 5 days a week). This will help you control your weight and prevent disease.    Limit alcohol to one drink per day.    No smoking.     Wear sunscreen to prevent skin cancer.     See your dentist every six months for an exam and cleaning.    See your eye doctor every 1 to 2 years.    Preventive Health Recommendations  Female Ages 50 - 64    Yearly exam: See your health care provider every year in order to  o Review health changes.   o Discuss preventive care.    o Review your medicines if your doctor has prescribed any.      Get a Pap test every three years (unless you have an abnormal result and your provider advises testing more often).    If you get Pap tests with HPV test, you only need to test every 5 years, unless you have an abnormal result.     You do not need a Pap test if your uterus was removed (hysterectomy) and you have not had cancer.    You should be tested each year for STDs (sexually transmitted diseases) if you're at risk.     Have a mammogram every 1 to 2 years.    Have a colonoscopy at age 50, or  have a yearly FIT test (stool test). These exams screen for colon cancer.      Have a cholesterol test every 5 years, or more often if advised.    Have a diabetes test (fasting glucose) every three years. If you are at risk for diabetes, you should have this test more often.     If you are at risk for osteoporosis (brittle bone disease), think about having a bone density scan (DEXA).    Shots: Get a flu shot each year. Get a tetanus shot every 10 years.    Nutrition:     Eat at least 5 servings of fruits and vegetables each day.    Eat whole-grain bread, whole-wheat pasta and brown rice instead of white grains and rice.    Get adequate Calcium and Vitamin D.     Lifestyle    Exercise at least 150 minutes a week (30 minutes a day, 5 days a week). This will help you control your weight and prevent disease.    Limit alcohol to one drink per day.    No smoking.     Wear sunscreen to prevent skin cancer.     See your dentist every six months for an exam and cleaning.    See your eye doctor every 1 to 2 years.

## 2020-01-31 NOTE — LETTER
[address]  [address]  [phone]    February 3, 2020      Mercy Pino  [address] [place] [address]            Dear Mercy     Your urine does not show excess protein.   Your thyroid function was normal.   Your cholesterol looks good.   Your electrolytes, kidney function and liver function were normal.     Your A1C shows that your diabetes is controlled.   Please call or MyChart my office with any questions or concerns.       Nancy Carver, PAC

## 2020-02-04 LAB
DEPRECATED CALCIDIOL+CALCIFEROL SERPL-MC: <56 UG/L (ref 20–75)
VITAMIN D2 SERPL-MCNC: <5 UG/L
VITAMIN D3 SERPL-MCNC: 51 UG/L

## 2020-10-14 ENCOUNTER — TELEPHONE (OUTPATIENT)
Dept: FAMILY MEDICINE | Facility: CLINIC | Age: 65
End: 2020-10-14

## 2020-10-14 NOTE — TELEPHONE ENCOUNTER
Please mail out FIT test and call patient and advise to complete FIT test  Also encourage to schedule [facility] ([phone]) formerly called [facility].              Panel Management Review   One phone call and send letter if unable to reach them or BabyBushart message and send letter if not read after 2 weeks (You will get a message to your inbasket)          Health Maintenance Due   Topic Date Due     DIABETIC FOOT EXAM  1955     Pneumococcal Vaccine: Pediatrics (0 to 5 Years) and At-Risk Patients (6 to 64 Years) (1 of 1 - PPSV23) 05/26/1963     HIV SCREENING  05/26/1972     HEPATITIS B IMMUNIZATION (1 of 3 - Risk 3-dose series) 05/26/1976     ZOSTER IMMUNIZATION (1 of 2) 05/26/2007     EYE EXAM  08/23/2014     DTAP/TDAP/TD IMMUNIZATION (2 - Td) 03/02/2017     COLORECTAL CANCER SCREENING  03/12/2019     A1C  07/31/2020     INFLUENZA VACCINE (1) 09/01/2020     MAMMO SCREENING  11/01/2020        Fail List measure: colonoscopy         Patient is due/failing the following:   Fit or colonoscopy     Action needed:   Please mail out fit test     Type of outreach:    phone encounter created- routed to team to call patient     Questions for provider review:    None                                                                                       Chart routed to Care Team .                                            Nancy Carver PA-C

## 2021-01-01 NOTE — RESULT ENCOUNTER NOTE
Awaiting rest of labs before sending letter  
Please send letter with lab results.     Dear Mercy  Your urine does not show excess protein.  Your thyroid function was normal.  Your cholesterol looks good.  Your electrolytes, kidney function and liver function were normal.    Your A1C shows that your diabetes is controlled.   Please call or MyChart my office with any questions or concerns.    Nancy Carver, PAC        
Please send letter with lab results.     Dear Mercy  Your vitamin D level was in the normal range.   Continue supplements as you have been taking.   Please call or MyChart my office with any questions or concerns.    Nancy Carver, PAC      
Statement Selected

## 2021-01-26 ENCOUNTER — ANCILLARY PROCEDURE (OUTPATIENT)
Dept: MAMMOGRAPHY | Facility: CLINIC | Age: 66
End: 2021-01-26
Attending: PHYSICIAN ASSISTANT
Payer: COMMERCIAL

## 2021-01-26 PROCEDURE — 77067 SCR MAMMO BI INCL CAD: CPT | Mod: GC | Performed by: RADIOLOGY

## 2021-02-06 ENCOUNTER — TELEPHONE (OUTPATIENT)
Dept: FAMILY MEDICINE | Facility: CLINIC | Age: 66
End: 2021-02-06

## 2021-02-06 DIAGNOSIS — E78.5 HYPERLIPIDEMIA, UNSPECIFIED HYPERLIPIDEMIA TYPE: ICD-10-CM

## 2021-02-06 NOTE — TELEPHONE ENCOUNTER
Reason for Call:  Other prescription    Detailed comments: pateint has been out of Lipitor - 20MG medication for one week now, please callback with refill     Phone Number Patient can be reached at: Home number on file [phone] (home)    Best Time: anytime    Can we leave a detailed message on this number? YES    Call taken on 2/6/2021 at 1:25 PM by Tracey Barahona

## 2021-02-08 RX ORDER — ATORVASTATIN CALCIUM 40 MG/1
20 TABLET, FILM COATED ORAL DAILY
Qty: 15 TABLET | Refills: 0 | Status: SHIPPED | OUTPATIENT
Start: 2021-02-08 | End: 2021-03-02

## 2021-02-08 NOTE — TELEPHONE ENCOUNTER
Routing refill request to provider for review/approval because:  Labs not current:  LDL      Austin Javed RN, BSN, PHN

## 2021-02-09 NOTE — TELEPHONE ENCOUNTER
This writer attempted to contact pt on 02/09/21      Reason for call schedule pre visit lab appt and left detailed message.      If patient calls back:   Schedule fasting Lab appointment prior to 3/02/21 visit, document that pt called and close encounter         Carli Gabriel

## 2021-02-11 NOTE — TELEPHONE ENCOUNTER
This writer attempted to contact pt on 02/11/21      Reason for call schedule labs and left message.      If patient calls back:   Schedule Lab appointment prior to 3/2 visit with lab, document that pt called and close encounter         Flavia Caro MA

## 2021-03-02 ENCOUNTER — OFFICE VISIT (OUTPATIENT)
Dept: FAMILY MEDICINE | Facility: CLINIC | Age: 66
End: 2021-03-02
Payer: COMMERCIAL

## 2021-03-02 VITALS
OXYGEN SATURATION: 99 % | RESPIRATION RATE: 14 BRPM | DIASTOLIC BLOOD PRESSURE: 81 MMHG | SYSTOLIC BLOOD PRESSURE: 121 MMHG | HEIGHT: 60 IN | BODY MASS INDEX: 26.5 KG/M2 | HEART RATE: 73 BPM | WEIGHT: 135 LBS | TEMPERATURE: 97.9 F

## 2021-03-02 DIAGNOSIS — Z00.00 ROUTINE GENERAL MEDICAL EXAMINATION AT A HEALTH CARE FACILITY: Primary | ICD-10-CM

## 2021-03-02 DIAGNOSIS — Z12.4 SCREENING FOR CERVICAL CANCER: ICD-10-CM

## 2021-03-02 DIAGNOSIS — Z13.21 ENCOUNTER FOR VITAMIN DEFICIENCY SCREENING: ICD-10-CM

## 2021-03-02 DIAGNOSIS — E78.5 HYPERLIPIDEMIA, UNSPECIFIED HYPERLIPIDEMIA TYPE: ICD-10-CM

## 2021-03-02 DIAGNOSIS — Z13.29 SCREENING FOR THYROID DISORDER: ICD-10-CM

## 2021-03-02 DIAGNOSIS — R79.89 ABNORMAL TSH: ICD-10-CM

## 2021-03-02 DIAGNOSIS — Z12.11 SCREENING FOR COLON CANCER: ICD-10-CM

## 2021-03-02 DIAGNOSIS — E11.9 TYPE 2 DIABETES MELLITUS WITHOUT COMPLICATION, WITHOUT LONG-TERM CURRENT USE OF INSULIN (H): ICD-10-CM

## 2021-03-02 LAB
ALBUMIN SERPL-MCNC: 3.9 G/DL (ref 3.4–5)
ALP SERPL-CCNC: 66 U/L (ref 40–150)
ALT SERPL W P-5'-P-CCNC: 36 U/L (ref 0–50)
ANION GAP SERPL CALCULATED.3IONS-SCNC: 1 MMOL/L (ref 3–14)
AST SERPL W P-5'-P-CCNC: 17 U/L (ref 0–45)
BILIRUB SERPL-MCNC: 0.5 MG/DL (ref 0.2–1.3)
BUN SERPL-MCNC: 20 MG/DL (ref 7–30)
CALCIUM SERPL-MCNC: 9 MG/DL (ref 8.5–10.1)
CHLORIDE SERPL-SCNC: 109 MMOL/L (ref 94–109)
CHOLEST SERPL-MCNC: 176 MG/DL
CO2 SERPL-SCNC: 32 MMOL/L (ref 20–32)
CREAT SERPL-MCNC: 0.67 MG/DL (ref 0.52–1.04)
CREAT UR-MCNC: 88 MG/DL
GFR SERPL CREATININE-BSD FRML MDRD: >90 ML/MIN/{1.73_M2}
GLUCOSE SERPL-MCNC: 124 MG/DL (ref 70–99)
HBA1C MFR BLD: 6.1 % (ref 0–5.6)
HDLC SERPL-MCNC: 66 MG/DL
LDLC SERPL CALC-MCNC: 79 MG/DL
MICROALBUMIN UR-MCNC: 8 MG/L
MICROALBUMIN/CREAT UR: 8.95 MG/G CR (ref 0–25)
NONHDLC SERPL-MCNC: 110 MG/DL
POTASSIUM SERPL-SCNC: 4 MMOL/L (ref 3.4–5.3)
PROT SERPL-MCNC: 7.5 G/DL (ref 6.8–8.8)
SODIUM SERPL-SCNC: 142 MMOL/L (ref 133–144)
T4 FREE SERPL-MCNC: 0.92 NG/DL (ref 0.76–1.46)
TRIGL SERPL-MCNC: 155 MG/DL
TSH SERPL DL<=0.005 MIU/L-ACNC: 5.29 MU/L (ref 0.4–4)

## 2021-03-02 PROCEDURE — 87624 HPV HI-RISK TYP POOLED RSLT: CPT | Performed by: PHYSICIAN ASSISTANT

## 2021-03-02 PROCEDURE — 83036 HEMOGLOBIN GLYCOSYLATED A1C: CPT | Performed by: PHYSICIAN ASSISTANT

## 2021-03-02 PROCEDURE — 99396 PREV VISIT EST AGE 40-64: CPT | Performed by: PHYSICIAN ASSISTANT

## 2021-03-02 PROCEDURE — 84439 ASSAY OF FREE THYROXINE: CPT | Performed by: PHYSICIAN ASSISTANT

## 2021-03-02 PROCEDURE — 82306 VITAMIN D 25 HYDROXY: CPT | Performed by: PHYSICIAN ASSISTANT

## 2021-03-02 PROCEDURE — 36415 COLL VENOUS BLD VENIPUNCTURE: CPT | Performed by: PHYSICIAN ASSISTANT

## 2021-03-02 PROCEDURE — 84443 ASSAY THYROID STIM HORMONE: CPT | Performed by: PHYSICIAN ASSISTANT

## 2021-03-02 PROCEDURE — 80053 COMPREHEN METABOLIC PANEL: CPT | Performed by: PHYSICIAN ASSISTANT

## 2021-03-02 PROCEDURE — 99213 OFFICE O/P EST LOW 20 MIN: CPT | Mod: 25 | Performed by: PHYSICIAN ASSISTANT

## 2021-03-02 PROCEDURE — G0145 SCR C/V CYTO,THINLAYER,RESCR: HCPCS | Performed by: PHYSICIAN ASSISTANT

## 2021-03-02 PROCEDURE — 80061 LIPID PANEL: CPT | Performed by: PHYSICIAN ASSISTANT

## 2021-03-02 PROCEDURE — 82043 UR ALBUMIN QUANTITATIVE: CPT | Performed by: PHYSICIAN ASSISTANT

## 2021-03-02 RX ORDER — ATORVASTATIN CALCIUM 40 MG/1
20 TABLET, FILM COATED ORAL DAILY
Qty: 45 TABLET | Refills: 3 | Status: SHIPPED | OUTPATIENT
Start: 2021-03-02 | End: 2022-02-03

## 2021-03-02 ASSESSMENT — MIFFLIN-ST. JEOR: SCORE: 1092.83

## 2021-03-02 NOTE — PROGRESS NOTES
SUBJECTIVE:   CC: Mercy Pino is an 65 year old woman who presents for preventive health visit.       Patient has been advised of split billing requirements and indicates understanding: Yes  Healthy Habits:    Do you get at least three servings of calcium containing foods daily (dairy, green leafy vegetables, etc.)? yes    Amount of exercise or daily activities, outside of work: 4 day(s) per week    Problems taking medications regularly No    Medication side effects: No    Have you had an eye exam in the past two years? yes    Do you see a dentist twice per year? yes    Do you have sleep apnea, excessive snoring or daytime drowsiness?no          Today's PHQ-2 Score:   PHQ-2 ( 1999 Pfizer) 3/2/2021 1/31/2020   Q1: Little interest or pleasure in doing things 0 0   Q2: Feeling down, depressed or hopeless 0 0   PHQ-2 Score 0 0       Abuse: Current or Past(Physical, Sexual or Emotional)- No  Do you feel safe in your environment? Yes        Social History     Tobacco Use     Smoking status: Never Smoker     Smokeless tobacco: Never Used     Tobacco comment: smoke free household.   Substance Use Topics     Alcohol use: No     If you drink alcohol do you typically have >3 drinks per day or >7 drinks per week? No                     Reviewed orders with patient.  Reviewed health maintenance and updated orders accordingly - Yes  BP Readings from Last 3 Encounters:   03/02/21 121/81   01/31/20 121/73   12/20/18 115/74    Wt Readings from Last 3 Encounters:   03/02/21 61.2 kg (135 lb)   01/31/20 63 kg (139 lb)   03/11/19 64.5 kg (142 lb 3.2 oz)                  Patient Active Problem List   Diagnosis     Dermatophytosis of nail     Contact dermatitis and other eczema, due to unspecified cause     Plantar fascial fibromatosis     Family history of diabetes mellitus     HYPERLIPIDEMIA LDL GOAL <130     Pre-diabetes     Cervical polyp     Anxiety     Food allergies     Advanced directives, counseling/discussion     Health  Care Home     Osteopenia     Bacterial keratitis, os     Corneal erosion syndrome?     Overweight     Type 2 diabetes mellitus without complication, without long-term current use of insulin (H)     Past Surgical History:   Procedure Laterality Date      SECTION      x2     LUMPECTOMY BREAST  age 23    benign       Social History     Tobacco Use     Smoking status: Never Smoker     Smokeless tobacco: Never Used     Tobacco comment: smoke free household.   Substance Use Topics     Alcohol use: No     Family History   Problem Relation Age of Onset     Cancer Mother         pancreatic cancer,  60     Diabetes Mother      Thyroid Disease Mother      Diabetes Maternal Grandmother      Breast Cancer Sister      Thyroid Disease Sister      Asthma No family hx of      C.A.D. No family hx of      Hypertension No family hx of      Cerebrovascular Disease No family hx of      Cancer - colorectal No family hx of      Prostate Cancer No family hx of          Current Outpatient Medications   Medication Sig Dispense Refill     alcohol swab prep pads Use to swab area of injection/melody as directed. 100 each 3     atorvastatin (LIPITOR) 40 MG tablet Take 0.5 tablets (20 mg) by mouth daily Due for annual office visit and fasting labs prior to further refills 45 tablet 3     blood glucose (NO BRAND SPECIFIED) test strip Use to test blood sugar 1 times daily or as directed. To accompany: Blood Glucose Monitor Brands: per insurance. 100 strip 6     blood glucose calibration (NO BRAND SPECIFIED) solution To accompany: Blood Glucose Monitor Brands: per insurance. 1 Bottle 3     blood glucose monitoring (FATOU MICROLET) lancets Use to test blood sugar 1 times daily 100 each 3     blood glucose monitoring (NO BRAND SPECIFIED) meter device kit Use to test blood sugar 1 times daily or as directed. Preferred blood glucose meter OR supplies to accompany: Blood Glucose Monitor Brands: per insurance. 1 kit 0     Blood Glucose  Monitoring Suppl (CONTOUR NEXT ONE) KIT 1 Device daily 1 kit 0     Calcium Citrate-Vitamin D (CALCIUM + D PO)        Cholecalciferol (VITAMIN D3 PO) Take by mouth daily       Glucos-MSM-C-Yn-Jzcadx-Qlvsdz (GLUCOSAMINE MSM COMPLEX) TABS tablet Take 1 tablet by mouth daily       multivitamin w/minerals (MULTI-VITAMIN) tablet Take 1 tablet by mouth daily       Omega-3 Fatty Acids (FISH OIL) 500 MG CAPS          Breast CA Risk Screening:  No flowsheet data found.      Mammogram Screening: Recommended mammography every 1-2 years with patient discussion and risk factor consideration  Pertinent mammograms are reviewed under the imaging tab.    Pertinent mammograms are reviewed under the imaging tab.  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  Eats rice once a week  History of diabetes   ROS:  CONSTITUTIONAL: NEGATIVE for fever, chills, change in weight  INTEGUMENTARY/SKIN: NEGATIVE for worrisome rashes, moles or lesions  EYES: NEGATIVE for vision changes or irritation  ENT: NEGATIVE for ear, mouth and throat problems  RESP:  "NEGATIVE for significant cough or SOB  BREAST: NEGATIVE for masses, tenderness or discharge  CV: NEGATIVE for chest pain, palpitations or peripheral edema  GI: NEGATIVE for nausea, abdominal pain, heartburn, or change in bowel habits  : NEGATIVE for unusual urinary or vaginal symptoms. No vaginal bleeding.  MUSCULOSKELETAL: NEGATIVE for significant arthralgias or myalgia  NEURO: NEGATIVE for weakness, dizziness or paresthesias  PSYCHIATRIC: NEGATIVE for changes in mood or affect     OBJECTIVE:   /81   Pulse 73   Temp 97.9  F (36.6  C) (Oral)   Resp 14   Ht 1.53 m (5' 0.25\")   Wt 61.2 kg (135 lb)   SpO2 99%   BMI 26.15 kg/m    EXAM:  GENERAL APPEARANCE: healthy, alert and no distress  EYES: Eyes grossly normal to inspection, PERRL and conjunctivae and sclerae normal  HENT: ear canals and TM's normal, nose and mouth without ulcers or lesions, oropharynx clear and oral mucous membranes moist  NECK: no adenopathy, no asymmetry, masses, or scars and thyroid normal to palpation  RESP: lungs clear to auscultation - no rales, rhonchi or wheezes  BREAST: normal without masses, tenderness or nipple discharge and no palpable axillary masses or adenopathy  CV: regular rate and rhythm, normal S1 S2, no S3 or S4, no murmur, click or rub, no peripheral edema and peripheral pulses strong  ABDOMEN: soft, nontender, no hepatosplenomegaly, no masses and bowel sounds normal   (female): normal female external genitalia, normal urethral meatus, vaginal mucosal atrophy noted, normal cervix, adnexae, and uterus without masses or abnormal discharge  MS: no musculoskeletal defects are noted and gait is age appropriate without ataxia  SKIN: no suspicious lesions or rashes  NEURO: Normal strength and tone, sensory exam grossly normal, mentation intact and speech normal  PSYCH: mentation appears normal and affect normal/bright    Diagnostic Test Results:  Results for orders placed or performed in visit on 03/02/21   HEMOGLOBIN " A1C     Status: Abnormal   Result Value Ref Range    Hemoglobin A1C 6.1 (H) 0 - 5.6 %   Albumin Random Urine Quantitative with Creat Ratio     Status: None   Result Value Ref Range    Creatinine Urine 88 mg/dL    Albumin Urine mg/L 8 mg/L    Albumin Urine mg/g Cr 8.95 0 - 25 mg/g Cr   Comprehensive metabolic panel (BMP + Alb, Alk Phos, ALT, AST, Total. Bili, TP)     Status: Abnormal   Result Value Ref Range    Sodium 142 133 - 144 mmol/L    Potassium 4.0 3.4 - 5.3 mmol/L    Chloride 109 94 - 109 mmol/L    Carbon Dioxide 32 20 - 32 mmol/L    Anion Gap 1 (L) 3 - 14 mmol/L    Glucose 124 (H) 70 - 99 mg/dL    Urea Nitrogen 20 7 - 30 mg/dL    Creatinine 0.67 0.52 - 1.04 mg/dL    GFR Estimate >90 >60 mL/min/[1.73_m2]    GFR Estimate If Black >90 >60 mL/min/[1.73_m2]    Calcium 9.0 8.5 - 10.1 mg/dL    Bilirubin Total 0.5 0.2 - 1.3 mg/dL    Albumin 3.9 3.4 - 5.0 g/dL    Protein Total 7.5 6.8 - 8.8 g/dL    Alkaline Phosphatase 66 40 - 150 U/L    ALT 36 0 - 50 U/L    AST 17 0 - 45 U/L   Lipid panel reflex to direct LDL Fasting     Status: Abnormal   Result Value Ref Range    Cholesterol 176 <200 mg/dL    Triglycerides 155 (H) <150 mg/dL    HDL Cholesterol 66 >49 mg/dL    LDL Cholesterol Calculated 79 <100 mg/dL    Non HDL Cholesterol 110 <130 mg/dL   TSH with free T4 reflex     Status: Abnormal   Result Value Ref Range    TSH 5.29 (H) 0.40 - 4.00 mU/L   T4 free     Status: None   Result Value Ref Range    T4 Free 0.92 0.76 - 1.46 ng/dL       ASSESSMENT/PLAN:   1. Routine general medical examination at a health care facility    - T4 free    2. Type 2 diabetes mellitus without complication, without long-term current use of insulin (H)  A1C at goal of 6.1. letter sent encouraging eye exam- not on medications    - HEMOGLOBIN A1C  - Albumin Random Urine Quantitative with Creat Ratio    3. Hyperlipidemia, unspecified hyperlipidemia type  Lipids well controlled with lipitor - continue and follow up with us in one year   -  "Comprehensive metabolic panel (BMP + Alb, Alk Phos, ALT, AST, Total. Bili, TP)  - Lipid panel reflex to direct LDL Fasting  - atorvastatin (LIPITOR) 40 MG tablet; Take 0.5 tablets (20 mg) by mouth daily Due for annual office visit and fasting labs prior to further refills  Dispense: 45 tablet; Refill: 3    4. Screening for thyroid disorder    - TSH with free T4 reflex    5. Encounter for vitamin deficiency screening    - 25 Hydroxyvitamin D2 and D3    6. Screening for colon cancer    - Fecal colorectal cancer screen (FIT); Future    7. Screening for cervical cancer    - Pap imaged thin layer screen with HPV - recommended age 30 - 65 years (select HPV order below)  - HPV High Risk Types DNA Cervical    8. Abnormal TSH  Recheck thyroid labs in approximately one week  - TSH with free T4 reflex; Future  - T3 Free; Future  - T3 total; Future  - Thyroid peroxidase antibody; Future  - Anti thyroglobulin antibody; Future    Patient has been advised of split billing requirements and indicates understanding: Yes  COUNSELING:   Reviewed preventive health counseling, as reflected in patient instructions       Regular exercise       Healthy diet/nutrition       Vision screening       Immunizations    Declined: Influenza, TDAP and Zoster due to Concerns about side effects/safety            Estimated body mass index is 26.7 kg/m  as calculated from the following:    Height as of 1/31/20: 1.537 m (5' 0.5\").    Weight as of 1/31/20: 63 kg (139 lb).    Weight management plan: Discussed healthy diet and exercise guidelines    She reports that she has never smoked. She has never used smokeless tobacco.      Counseling Resources:  ATP IV Guidelines  Pooled Cohorts Equation Calculator  Breast Cancer Risk Calculator  BRCA-Related Cancer Risk Assessment: FHS-7 Tool  FRAX Risk Assessment  ICSI Preventive Guidelines  Dietary Guidelines for Americans, 2010  USDA's MyPlate  ASA Prophylaxis  Lung CA Screening    Nancy Carver PA-C  M " [facility]

## 2021-03-02 NOTE — PATIENT INSTRUCTIONS
Bring in FIT card for colon cancer screening.   I will notify you of lab results via [facility]   Patient Education      Preventive Health Recommendations  Female Ages 50 - 64    Yearly exam: See your health care provider every year in order to  o Review health changes.   o Discuss preventive care.    o Review your medicines if your doctor has prescribed any.      Get a Pap test every three years (unless you have an abnormal result and your provider advises testing more often).    If you get Pap tests with HPV test, you only need to test every 5 years, unless you have an abnormal result.     You do not need a Pap test if your uterus was removed (hysterectomy) and you have not had cancer.    You should be tested each year for STDs (sexually transmitted diseases) if you're at risk.     Have a mammogram every 1 to 2 years.    Have a colonoscopy at age 50, or have a yearly FIT test (stool test). These exams screen for colon cancer.      Have a cholesterol test every 5 years, or more often if advised.    Have a diabetes test (fasting glucose) every three years. If you are at risk for diabetes, you should have this test more often.     If you are at risk for osteoporosis (brittle bone disease), think about having a bone density scan (DEXA).    Shots: Get a flu shot each year. Get a tetanus shot every 10 years.    Nutrition:     Eat at least 5 servings of fruits and vegetables each day.    Eat whole-grain bread, whole-wheat pasta and brown rice instead of white grains and rice.    Get adequate Calcium and Vitamin D.     Lifestyle    Exercise at least 150 minutes a week (30 minutes a day, 5 days a week). This will help you control your weight and prevent disease.    Limit alcohol to one drink per day.    No smoking.     Wear sunscreen to prevent skin cancer.     See your dentist every six months for an exam and cleaning.    See your eye doctor every 1 to 2 years.

## 2021-03-03 ENCOUNTER — TELEPHONE (OUTPATIENT)
Dept: FAMILY MEDICINE | Facility: CLINIC | Age: 66
End: 2021-03-03

## 2021-03-03 NOTE — LETTER
March 3, 2021      Mercy Pino  [address] [place] [address]        Dear Mercy,     I forgot to mention at your last exam how important it is to have an annual eye exam.  [facility] ([phone][facility] called [facility].  Please schedule your eye exam as soon as possible.  Given your diabetes it is very important to have an annual dilated eye exam.   Please call or MyChart my office with any questions or concerns.        Sincerely,        Nancy Carver PA-C

## 2021-03-03 NOTE — RESULT ENCOUNTER NOTE
Please call and advise and send letter with lab results.     Dear Mercy    Your urine does not show excess protein.   Your thyroid is a bit off and you may not be producing enough thyroid medication. This may be one reason it has been hard to lose weight.  Schedule a nonfasting lab only appointment in 1-2 weeks to look at this further.   Your cholesterol looks good on the lipitor - continue at present dose.  Your A1C was 6.1.  Your diabetes is well controlled.   Your electrolytes, kidney function and liver function were normal.    Your vitamin D level is still pending.   Please call or MyChart my office with any questions or concerns.      Nancy Carver, PAC

## 2021-03-04 LAB
COPATH REPORT: NORMAL
PAP: NORMAL

## 2021-03-05 LAB
FINAL DIAGNOSIS: NORMAL
HPV HR 12 DNA CVX QL NAA+PROBE: NEGATIVE
HPV16 DNA SPEC QL NAA+PROBE: NEGATIVE
HPV18 DNA SPEC QL NAA+PROBE: NEGATIVE
SPECIMEN DESCRIPTION: NORMAL
SPECIMEN SOURCE CVX/VAG CYTO: NORMAL

## 2021-03-06 LAB
DEPRECATED CALCIDIOL+CALCIFEROL SERPL-MC: <65 UG/L (ref 20–75)
VITAMIN D2 SERPL-MCNC: <5 UG/L
VITAMIN D3 SERPL-MCNC: 60 UG/L

## 2021-03-06 NOTE — RESULT ENCOUNTER NOTE
Please send letter with lab results    Dear Mercy  Your vitamin D level was normal.  Continue supplement as you have been taking.   Please call or MyChart my office with any questions or concerns.   Nancy Carver, PAC

## 2021-04-09 DIAGNOSIS — R79.89 ABNORMAL TSH: ICD-10-CM

## 2021-04-09 LAB
T3 SERPL-MCNC: 97 NG/DL (ref 60–181)
T3FREE SERPL-MCNC: 2.2 PG/ML (ref 2.3–4.2)
T4 FREE SERPL-MCNC: 0.82 NG/DL (ref 0.76–1.46)
TSH SERPL DL<=0.005 MIU/L-ACNC: 5.13 MU/L (ref 0.4–4)

## 2021-04-09 PROCEDURE — 84439 ASSAY OF FREE THYROXINE: CPT | Performed by: PHYSICIAN ASSISTANT

## 2021-04-09 PROCEDURE — 84481 FREE ASSAY (FT-3): CPT | Performed by: PHYSICIAN ASSISTANT

## 2021-04-09 PROCEDURE — 84480 ASSAY TRIIODOTHYRONINE (T3): CPT | Performed by: PHYSICIAN ASSISTANT

## 2021-04-09 PROCEDURE — 84443 ASSAY THYROID STIM HORMONE: CPT | Performed by: PHYSICIAN ASSISTANT

## 2021-04-09 PROCEDURE — 86800 THYROGLOBULIN ANTIBODY: CPT | Performed by: PHYSICIAN ASSISTANT

## 2021-04-09 PROCEDURE — 86376 MICROSOMAL ANTIBODY EACH: CPT | Performed by: PHYSICIAN ASSISTANT

## 2021-04-09 PROCEDURE — 36415 COLL VENOUS BLD VENIPUNCTURE: CPT | Performed by: PHYSICIAN ASSISTANT

## 2021-04-12 ENCOUNTER — TELEPHONE (OUTPATIENT)
Dept: FAMILY MEDICINE | Facility: CLINIC | Age: 66
End: 2021-04-12

## 2021-04-12 DIAGNOSIS — E06.3 HYPOTHYROIDISM DUE TO HASHIMOTO'S THYROIDITIS: Primary | ICD-10-CM

## 2021-04-12 LAB
THYROGLOB AB SERPL IA-ACNC: 93 IU/ML (ref 0–40)
THYROPEROXIDASE AB SERPL-ACNC: 15 IU/ML

## 2021-04-12 NOTE — TELEPHONE ENCOUNTER
Nancy Carver PA-C   4/12/2021  3:00 PM CDT      Please call and advise that patient has thyroid antibodies as well as low functioning thyroid.  Find out which pharmacy she would like to use.  Will start levothyroxine 25 mcg daily and recheck nonfasting labs in 6-8 weeks.

## 2021-04-12 NOTE — TELEPHONE ENCOUNTER
This writer attempted to contact Mercy on 04/12/21      Reason for call abnormal results and informed to call back in the am around 10 am.      If patient calls back:   Registered Nurse called. Follow Triage Call workflow Laly Devi RN

## 2021-04-12 NOTE — RESULT ENCOUNTER NOTE
Please call and advise that patient has thyroid antibodies as well as low functioning thyroid.  Find out which pharmacy she would like to use.  Will start levothyroxine 25 mcg daily and recheck nonfasting labs in 6-8 weeks.

## 2021-04-13 RX ORDER — LEVOTHYROXINE SODIUM 25 UG/1
25 TABLET ORAL DAILY
Qty: 30 TABLET | Refills: 1 | Status: SHIPPED | OUTPATIENT
Start: 2021-04-13 | End: 2021-05-26 | Stop reason: DRUGHIGH

## 2021-04-13 NOTE — TELEPHONE ENCOUNTER
Called patient and gave message per Nancy Carver PA-C.  Patient verbalized understanding and agreement to plan.    Routing to provider to send Rx.      Patient scheduled lab appt for 6 weeks out    Mindy Benoit RN, [facility]

## 2021-05-26 DIAGNOSIS — E06.3 HYPOTHYROIDISM DUE TO HASHIMOTO'S THYROIDITIS: Primary | ICD-10-CM

## 2021-05-26 DIAGNOSIS — Z12.11 SCREENING FOR COLON CANCER: ICD-10-CM

## 2021-05-26 DIAGNOSIS — E06.3 HYPOTHYROIDISM DUE TO HASHIMOTO'S THYROIDITIS: ICD-10-CM

## 2021-05-26 LAB
HEMOCCULT STL QL IA: NEGATIVE
T4 FREE SERPL-MCNC: 0.95 NG/DL (ref 0.76–1.46)
TSH SERPL DL<=0.005 MIU/L-ACNC: 4.03 MU/L (ref 0.4–4)

## 2021-05-26 PROCEDURE — 36415 COLL VENOUS BLD VENIPUNCTURE: CPT | Performed by: PHYSICIAN ASSISTANT

## 2021-05-26 PROCEDURE — 82274 ASSAY TEST FOR BLOOD FECAL: CPT | Performed by: PHYSICIAN ASSISTANT

## 2021-05-26 PROCEDURE — 84439 ASSAY OF FREE THYROXINE: CPT | Performed by: PHYSICIAN ASSISTANT

## 2021-05-26 PROCEDURE — 84443 ASSAY THYROID STIM HORMONE: CPT | Performed by: PHYSICIAN ASSISTANT

## 2021-05-26 RX ORDER — LEVOTHYROXINE SODIUM 50 UG/1
50 TABLET ORAL DAILY
Qty: 30 TABLET | Refills: 1 | Status: SHIPPED | OUTPATIENT
Start: 2021-05-26 | End: 2021-07-10

## 2021-05-26 NOTE — LETTER
May 27, 2021      Mercy Pino  [address] [place] [address]        Dear ,    Your stool was normal with no evidence of blood.  This is a screening test for colon cancer and though it is not as thorough as a full colonoscopy it has been found accurate in detecting colon lesions.  We should repeat this annually.     Please call or MyChart my office with any questions or concerns.       Nancy Carver, PAC       Resulted Orders   TSH with free T4 reflex   Result Value Ref Range    TSH 4.03 (H) 0.40 - 4.00 mU/L   Fecal colorectal cancer screen (FIT)   Result Value Ref Range    Occult Blood Scn FIT Negative NEG^Negative   T4 free   Result Value Ref Range    T4 Free 0.95 0.76 - 1.46 ng/dL

## 2021-05-26 NOTE — RESULT ENCOUNTER NOTE
Please call and advise that still a bit hypothyroid - I would like to increase levothyroxine from 25 mcg daily to 50 mcg daily and recheck labs in 6-8 weeks.

## 2021-05-27 ENCOUNTER — TELEPHONE (OUTPATIENT)
Dept: FAMILY MEDICINE | Facility: CLINIC | Age: 66
End: 2021-05-27

## 2021-05-27 NOTE — RESULT ENCOUNTER NOTE
Please send letter as below       Dear Mercy  Your stool was normal with no evidence of blood.  This is a screening test for colon cancer and though it is not as thorough as a full colonoscopy it has been found accurate in detecting colon lesions.  We should repeat this annually.    Please call or MyChart my office with any questions or concerns.   Nancy Carver, PAC

## 2021-05-27 NOTE — TELEPHONE ENCOUNTER
Nancy Carver PA-C   5/26/2021  6:52 PM CDT      Please call and advise that still a bit hypothyroid - I would like to increase levothyroxine from 25 mcg daily to 50 mcg daily and recheck labs in 6-8 weeks.

## 2021-05-27 NOTE — TELEPHONE ENCOUNTER
This writer attempted to contact Mercy on 05/27/21      Reason for call abnormal results and left message.      If patient calls back:   Registered Nurse called. Follow Triage Call workflow Juan Manuel Devi, ODETTE

## 2021-05-28 NOTE — TELEPHONE ENCOUNTER
Left message on answering machine for patient to call back to [phone].    RN please give patient provider message as written.  Aliyah GALLEGON, RN

## 2021-06-02 NOTE — TELEPHONE ENCOUNTER
Contacted patient.  Gave her PA-C's instructions/lab results as per message below.  She states already started the levothyroxine 50mcg; one tab daily.  I assisted her with making her future nonfasting lab only appointment.  Provider has already placed the future orders. Anabel Vázquez RN

## 2021-07-09 DIAGNOSIS — E06.3 HYPOTHYROIDISM DUE TO HASHIMOTO'S THYROIDITIS: ICD-10-CM

## 2021-07-09 LAB — TSH SERPL DL<=0.005 MIU/L-ACNC: 2 MU/L (ref 0.4–4)

## 2021-07-09 PROCEDURE — 36415 COLL VENOUS BLD VENIPUNCTURE: CPT | Performed by: PHYSICIAN ASSISTANT

## 2021-07-09 PROCEDURE — 84443 ASSAY THYROID STIM HORMONE: CPT | Performed by: PHYSICIAN ASSISTANT

## 2021-07-09 NOTE — LETTER
July 12, 2021      Mercy Pino  [address] [place] [address]          Dear Mercy   Your thyroid function was normal.   Continue the levothyroxine 50 mcg daily- I have sent prescription to your [facility] pharmacy.   Please call or MyChart my office with any questions or concerns.   Nancy Carver, PAC       Resulted Orders   TSH with free T4 reflex   Result Value Ref Range    TSH 2.00 0.40 - 4.00 mU/L

## 2021-07-10 DIAGNOSIS — E06.3 HYPOTHYROIDISM DUE TO HASHIMOTO'S THYROIDITIS: ICD-10-CM

## 2021-07-10 RX ORDER — LEVOTHYROXINE SODIUM 50 UG/1
50 TABLET ORAL DAILY
Qty: 90 TABLET | Refills: 2 | Status: SHIPPED | OUTPATIENT
Start: 2021-07-10 | End: 2022-03-18

## 2021-07-10 NOTE — RESULT ENCOUNTER NOTE
Please send letter with lab results.     Dear Mercy  Your thyroid function was normal.   Continue the levothyroxine 50 mcg daily- I have sent prescription to your [facility] pharmacy.  Please call or MyChart my office with any questions or concerns.   Nancy Carver, PAC

## 2021-12-17 ENCOUNTER — VIRTUAL VISIT (OUTPATIENT)
Dept: FAMILY MEDICINE | Facility: CLINIC | Age: 66
End: 2021-12-17
Payer: COMMERCIAL

## 2021-12-17 DIAGNOSIS — U07.1 INFECTION DUE TO 2019 NOVEL CORONAVIRUS: Primary | ICD-10-CM

## 2021-12-17 PROCEDURE — 99214 OFFICE O/P EST MOD 30 MIN: CPT | Mod: 95 | Performed by: FAMILY MEDICINE

## 2021-12-17 NOTE — PROGRESS NOTES
"Mercy is a 66 year old who is being evaluated via a billable telephone visit.      What phone number would you like to be contacted at? cell  How would you like to obtain your AVS? Mail a copy    Assessment & Plan     Infection due to 2019 novel coronavirus  Patient recovering from COVID-19 infection.  Is on approximately day 10 or 11 since symptoms have arisen.  Feels that she is recovering.  Sense of taste and smell has returned.  No fever.  Still coughing a bit and tired.  She is currently on vacation and is scheduled to go back on January 2.  Needs a note saying she has completed her isolation.  We discussed warning signs to watch for if infection is worsening and discussed CDC recommendations for booster vaccination even after infection.         BMI:   Estimated body mass index is 26.15 kg/m  as calculated from the following:    Height as of 3/2/21: 1.53 m (5' 0.25\").    Weight as of 3/2/21: 61.2 kg (135 lb).       See Patient Instructions    Return in about 1 week (around 12/24/2021) for If not improving as expected, phone visit.    Asuncion Schmidt MD  [facility]    Ashley Madrid is a 66 year old who presents for the following health issues     HPI     Visit with patient today to talk about return to work following Covid infection.  Is doing much better.  Not fully recovered yet.    Review of Systems   Constitutional, HEENT, cardiovascular, pulmonary, gi and gu systems are negative, except as otherwise noted.      Objective           Vitals:  No vitals were obtained today due to virtual visit.    Physical Exam   healthy, alert and no distress  PSYCH: Alert and oriented times 3; coherent speech, normal   rate and volume, able to articulate logical thoughts, able   to abstract reason, no tangential thoughts, no hallucinations   or delusions  Her affect is normal  RESP: No cough, no audible wheezing, able to talk in full sentences  Remainder of exam unable to be completed " due to telephone visits    Past labs reviewed with the patient.             Phone call duration: 9 minutes

## 2021-12-17 NOTE — LETTER
[address] [address]  Phone: [phone]    December 17, 2021        Mercy Pino  [address] [place] [address]          To whom it may concern:    RE: Mercy Pino    Patient was seen and treated today at our clinic.  Diagnosed with COVID-19.  Completing her recommended period of isolation and recovery and should be able to return to work 1/2/2022.    Please contact me for questions or concerns.      Sincerely,        Asuncion Schmidt MD

## 2022-02-03 DIAGNOSIS — E78.5 HYPERLIPIDEMIA, UNSPECIFIED HYPERLIPIDEMIA TYPE: ICD-10-CM

## 2022-02-03 RX ORDER — ATORVASTATIN CALCIUM 40 MG/1
20 TABLET, FILM COATED ORAL DAILY
Qty: 45 TABLET | Refills: 3 | Status: SHIPPED | OUTPATIENT
Start: 2022-02-03 | End: 2022-03-18 | Stop reason: ALTCHOICE

## 2022-02-03 NOTE — TELEPHONE ENCOUNTER
.Reason for call:  Medication   If this is a refill request, has the caller requested the refill from the pharmacy already? No  Will the patient be using a [facility]? No  Name of the pharmacy and phone [facility]    Name of the medication requested: lipitor    Other request: fill script    Phone number to reach patient:  Home number on file [phone] (home)    Best Time:  anyime    Can we leave a detailed message on this number?  YES    Travel screening: Negative

## 2022-03-01 DIAGNOSIS — E78.5 HYPERLIPIDEMIA LDL GOAL <130: ICD-10-CM

## 2022-03-01 DIAGNOSIS — E11.9 TYPE 2 DIABETES MELLITUS WITHOUT COMPLICATION, WITHOUT LONG-TERM CURRENT USE OF INSULIN (H): ICD-10-CM

## 2022-03-01 DIAGNOSIS — Z13.21 ENCOUNTER FOR VITAMIN DEFICIENCY SCREENING: ICD-10-CM

## 2022-03-01 DIAGNOSIS — E78.5 HYPERLIPIDEMIA, UNSPECIFIED HYPERLIPIDEMIA TYPE: ICD-10-CM

## 2022-03-01 DIAGNOSIS — E06.3 HYPOTHYROIDISM DUE TO HASHIMOTO'S THYROIDITIS: Primary | ICD-10-CM

## 2022-03-01 NOTE — PROGRESS NOTES
This patient has an appointment for lab work but does not have any orders. Please place some future orders as needed.    Thank You,  Aliyah Laguna MLT (ASCP)

## 2022-03-11 ENCOUNTER — LAB (OUTPATIENT)
Dept: LAB | Facility: CLINIC | Age: 67
End: 2022-03-11
Payer: COMMERCIAL

## 2022-03-11 DIAGNOSIS — E78.5 HYPERLIPIDEMIA LDL GOAL <130: ICD-10-CM

## 2022-03-11 DIAGNOSIS — Z13.21 ENCOUNTER FOR VITAMIN DEFICIENCY SCREENING: ICD-10-CM

## 2022-03-11 DIAGNOSIS — E06.3 HYPOTHYROIDISM DUE TO HASHIMOTO'S THYROIDITIS: ICD-10-CM

## 2022-03-11 DIAGNOSIS — E78.5 HYPERLIPIDEMIA, UNSPECIFIED HYPERLIPIDEMIA TYPE: ICD-10-CM

## 2022-03-11 DIAGNOSIS — E11.9 TYPE 2 DIABETES MELLITUS WITHOUT COMPLICATION, WITHOUT LONG-TERM CURRENT USE OF INSULIN (H): ICD-10-CM

## 2022-03-11 LAB
ALBUMIN SERPL-MCNC: 3.6 G/DL (ref 3.4–5)
ALP SERPL-CCNC: 55 U/L (ref 40–150)
ALT SERPL W P-5'-P-CCNC: 43 U/L (ref 0–50)
ANION GAP SERPL CALCULATED.3IONS-SCNC: 5 MMOL/L (ref 3–14)
AST SERPL W P-5'-P-CCNC: 23 U/L (ref 0–45)
BILIRUB SERPL-MCNC: 0.7 MG/DL (ref 0.2–1.3)
BUN SERPL-MCNC: 15 MG/DL (ref 7–30)
CALCIUM SERPL-MCNC: 9.2 MG/DL (ref 8.5–10.1)
CHLORIDE BLD-SCNC: 108 MMOL/L (ref 94–109)
CHOLEST SERPL-MCNC: 156 MG/DL
CO2 SERPL-SCNC: 29 MMOL/L (ref 20–32)
CREAT SERPL-MCNC: 0.62 MG/DL (ref 0.52–1.04)
FASTING STATUS PATIENT QL REPORTED: YES
GFR SERPL CREATININE-BSD FRML MDRD: >90 ML/MIN/1.73M2
GLUCOSE BLD-MCNC: 130 MG/DL (ref 70–99)
HBA1C MFR BLD: 6.4 % (ref 0–5.6)
HDLC SERPL-MCNC: 63 MG/DL
LDLC SERPL CALC-MCNC: 65 MG/DL
NONHDLC SERPL-MCNC: 93 MG/DL
POTASSIUM BLD-SCNC: 3.8 MMOL/L (ref 3.4–5.3)
PROT SERPL-MCNC: 7.4 G/DL (ref 6.8–8.8)
SODIUM SERPL-SCNC: 142 MMOL/L (ref 133–144)
T4 FREE SERPL-MCNC: 1.03 NG/DL (ref 0.76–1.46)
TRIGL SERPL-MCNC: 142 MG/DL
TSH SERPL DL<=0.005 MIU/L-ACNC: 4.74 MU/L (ref 0.4–4)

## 2022-03-11 PROCEDURE — 84439 ASSAY OF FREE THYROXINE: CPT

## 2022-03-11 PROCEDURE — 80053 COMPREHEN METABOLIC PANEL: CPT

## 2022-03-11 PROCEDURE — 84443 ASSAY THYROID STIM HORMONE: CPT

## 2022-03-11 PROCEDURE — 82306 VITAMIN D 25 HYDROXY: CPT

## 2022-03-11 PROCEDURE — 36415 COLL VENOUS BLD VENIPUNCTURE: CPT

## 2022-03-11 PROCEDURE — 83036 HEMOGLOBIN GLYCOSYLATED A1C: CPT

## 2022-03-11 PROCEDURE — 80061 LIPID PANEL: CPT

## 2022-03-14 ENCOUNTER — TELEPHONE (OUTPATIENT)
Dept: FAMILY MEDICINE | Facility: CLINIC | Age: 67
End: 2022-03-14
Payer: COMMERCIAL

## 2022-03-14 NOTE — TELEPHONE ENCOUNTER
Attempted to contact patient regarding result note from provider as written below.     No answer, left voicemail requesting call back to RNs at [phone]. If Pt returns call, relay result note as written below.     Nancy Carver PA-C   3/11/2022  3:44 PM CST Back to Top        Please call and advise      TSH is a bit underactive-has she missed any medication?  T4 was normal.   We could continue at same dose and recheck in 6 months   Your electrolytes, kidney function and liver function were normal.     Your cholesterol looks great.   Your A1C shows that your diabetes is well controlled.  Let me know your thoughts about the thyroid   Your vitamin D level is still pending.      Mari Peraza RN, BSN  [facility]

## 2022-03-15 NOTE — TELEPHONE ENCOUNTER
Attempted to contact Pt regarding result note written below - no answer, unable to leave VM due to full mailbox.     Mari Peraza RN, BSN  [facility]

## 2022-03-16 LAB
DEPRECATED CALCIDIOL+CALCIFEROL SERPL-MC: <63 UG/L (ref 20–75)
VITAMIN D2 SERPL-MCNC: <5 UG/L
VITAMIN D3 SERPL-MCNC: 58 UG/L

## 2022-03-16 NOTE — TELEPHONE ENCOUNTER
Called patient regarding result note written below.     Pt states she has not taken her levothyroxine in a long time. Pt unaware she was supposed to continue with this.     Pt was unable to discuss further as she was at work - Pt states she will talk with PCP about this at her visit on Friday.     Will route to provider as RAHEEL.    Mari Peraza RN, BSN  [facility]

## 2022-03-18 ENCOUNTER — OFFICE VISIT (OUTPATIENT)
Dept: FAMILY MEDICINE | Facility: CLINIC | Age: 67
End: 2022-03-18
Payer: COMMERCIAL

## 2022-03-18 VITALS
DIASTOLIC BLOOD PRESSURE: 70 MMHG | BODY MASS INDEX: 28.11 KG/M2 | OXYGEN SATURATION: 99 % | SYSTOLIC BLOOD PRESSURE: 112 MMHG | TEMPERATURE: 97.8 F | WEIGHT: 143.2 LBS | HEART RATE: 83 BPM | RESPIRATION RATE: 19 BRPM | HEIGHT: 60 IN

## 2022-03-18 DIAGNOSIS — Z12.11 SCREENING FOR COLON CANCER: ICD-10-CM

## 2022-03-18 DIAGNOSIS — E06.3 HYPOTHYROIDISM DUE TO HASHIMOTO'S THYROIDITIS: ICD-10-CM

## 2022-03-18 DIAGNOSIS — E78.5 HYPERLIPIDEMIA LDL GOAL <100: ICD-10-CM

## 2022-03-18 DIAGNOSIS — Z00.00 ROUTINE GENERAL MEDICAL EXAMINATION AT A HEALTH CARE FACILITY: Primary | ICD-10-CM

## 2022-03-18 DIAGNOSIS — E11.9 TYPE 2 DIABETES MELLITUS WITHOUT COMPLICATION, WITHOUT LONG-TERM CURRENT USE OF INSULIN (H): ICD-10-CM

## 2022-03-18 DIAGNOSIS — Z12.31 VISIT FOR SCREENING MAMMOGRAM: ICD-10-CM

## 2022-03-18 LAB
CREAT UR-MCNC: 74 MG/DL
MICROALBUMIN UR-MCNC: 5 MG/L
MICROALBUMIN/CREAT UR: 6.76 MG/G CR (ref 0–25)

## 2022-03-18 PROCEDURE — 99214 OFFICE O/P EST MOD 30 MIN: CPT | Mod: 25 | Performed by: PHYSICIAN ASSISTANT

## 2022-03-18 PROCEDURE — 99207 PR FOOT EXAM NO CHARGE: CPT | Mod: 25 | Performed by: PHYSICIAN ASSISTANT

## 2022-03-18 PROCEDURE — 82043 UR ALBUMIN QUANTITATIVE: CPT | Performed by: PHYSICIAN ASSISTANT

## 2022-03-18 PROCEDURE — 99396 PREV VISIT EST AGE 40-64: CPT | Performed by: PHYSICIAN ASSISTANT

## 2022-03-18 RX ORDER — ATORVASTATIN CALCIUM 20 MG/1
20 TABLET, FILM COATED ORAL DAILY
Qty: 90 TABLET | Refills: 3 | Status: SHIPPED | OUTPATIENT
Start: 2022-03-18 | End: 2023-03-31

## 2022-03-18 RX ORDER — LEVOTHYROXINE SODIUM 50 UG/1
50 TABLET ORAL DAILY
Qty: 90 TABLET | Refills: 0 | Status: SHIPPED | OUTPATIENT
Start: 2022-03-18 | End: 2022-09-16

## 2022-03-18 ASSESSMENT — ENCOUNTER SYMPTOMS
HEARTBURN: 0
FREQUENCY: 0
HEMATOCHEZIA: 0
BREAST MASS: 0
ARTHRALGIAS: 0
ABDOMINAL PAIN: 0
SHORTNESS OF BREATH: 0
HEADACHES: 0
EYE PAIN: 0
DYSURIA: 0
CONSTIPATION: 0
DIARRHEA: 0
WEAKNESS: 0
JOINT SWELLING: 0
MYALGIAS: 0
SORE THROAT: 0
DIZZINESS: 0
COUGH: 0
PALPITATIONS: 0
NERVOUS/ANXIOUS: 0
CHILLS: 0
PARESTHESIAS: 0
FEVER: 0
NAUSEA: 0
HEMATURIA: 0

## 2022-03-18 ASSESSMENT — PAIN SCALES - GENERAL: PAINLEVEL: NO PAIN (0)

## 2022-03-18 NOTE — PROGRESS NOTES
tsh     SUBJECTIVE:   CC: Mercy Pino is an 66 year old woman who presents for preventive health visit.       Patient has been advised of split billing requirements and indicates understanding: Yes  Healthy Habits:     Getting at least 3 servings of Calcium per day:  Yes    Bi-annual eye exam:  Yes    Dental care twice a year:  Yes    Sleep apnea or symptoms of sleep apnea:  None    Diet:  Other    Frequency of exercise:  2-3 days/week    Duration of exercise:  N/A    Medication side effects:  None    PHQ-2 Total Score: 0    Additional concerns today:  No        Diabetes Follow-up      How often are you checking your blood sugar? Not at all    What concerns do you have today about your diabetes? None     Do you have any of these symptoms? (Select all that apply)  No numbness or tingling in feet.  No redness, sores or blisters on feet.  No complaints of excessive thirst.  No reports of blurry vision.  No significant changes to weight.    Have you had a diabetic eye exam in the last 12 months? No        BP Readings from Last 2 Encounters:   03/18/22 112/70   03/02/21 121/81     Hemoglobin A1C POCT (%)   Date Value   03/02/2021 6.1 (H)   01/31/2020 6.1 (H)     Hemoglobin A1C (%)   Date Value   03/11/2022 6.4 (H)     LDL Cholesterol Calculated (mg/dL)   Date Value   03/11/2022 65   03/02/2021 79   01/31/2020 84               Hyperlipidemia Follow-Up      Are you regularly taking any medication or supplement to lower your cholesterol?   Yes- lipitor     Are you having muscle aches or other side effects that you think could be caused by your cholesterol lowering medication?  No      Today's PHQ-2 Score:   PHQ-2 ( 1999 Pfizer) 3/2/2021   Q1: Little interest or pleasure in doing things 0   Q2: Feeling down, depressed or hopeless 0   PHQ-2 Score 0   PHQ-2 Total Score (12-17 Years)- Positive if 3 or more points; Administer PHQ-A if positive 0       Abuse: Current or Past (Physical, Sexual or Emotional) - No  Do you feel  safe in your environment? Yes        Social History     Tobacco Use     Smoking status: Never Smoker     Smokeless tobacco: Never Used     Tobacco comment: smoke free household.   Substance Use Topics     Alcohol use: No     If you drink alcohol do you typically have >3 drinks per day or >7 drinks per week? No    Alcohol Use 10/31/2016   Prescreen: >3 drinks/day or >7 drinks/week? The patient does not drink >3 drinks per day nor >7 drinks per week.       Reviewed orders with patient.  Reviewed health maintenance and updated orders accordingly - Yes  BP Readings from Last 3 Encounters:   22 112/70   21 121/81   20 121/73    Wt Readings from Last 3 Encounters:   22 65 kg (143 lb 3.2 oz)   21 61.2 kg (135 lb)   20 63 kg (139 lb)                  Patient Active Problem List   Diagnosis     Dermatophytosis of nail     Contact dermatitis and other eczema, due to unspecified cause     Plantar fascial fibromatosis     Family history of diabetes mellitus     Hyperlipidemia LDL goal <100     Pre-diabetes     Cervical polyp     Anxiety     Food allergies     Advanced directives, counseling/discussion     Health Care Home     Osteopenia     Bacterial keratitis, os     Corneal erosion syndrome?     Overweight     Type 2 diabetes mellitus without complication, without long-term current use of insulin (H)     Infection due to 2019 novel coronavirus     Hypothyroidism due to Hashimoto's thyroiditis     Past Surgical History:   Procedure Laterality Date      SECTION      x2     LUMPECTOMY BREAST  age 23    benign       Social History     Tobacco Use     Smoking status: Never Smoker     Smokeless tobacco: Never Used     Tobacco comment: smoke free household.   Substance Use Topics     Alcohol use: No     Family History   Problem Relation Age of Onset     Cancer Mother         pancreatic cancer,  60     Diabetes Mother      Thyroid Disease Mother      Diabetes Maternal Grandmother       Breast Cancer Sister      Thyroid Disease Sister      Asthma No family hx of      C.A.D. No family hx of      Hypertension No family hx of      Cerebrovascular Disease No family hx of      Cancer - colorectal No family hx of      Prostate Cancer No family hx of          Current Outpatient Medications   Medication Sig Dispense Refill     alcohol swab prep pads Use to swab area of injection/melody as directed. 100 each 3     atorvastatin (LIPITOR) 20 MG tablet Take 1 tablet (20 mg) by mouth daily 90 tablet 3     blood glucose (NO BRAND SPECIFIED) test strip Use to test blood sugar 1 times daily or as directed. To accompany: Blood Glucose Monitor Brands: per insurance. 100 strip 6     blood glucose calibration (NO BRAND SPECIFIED) solution To accompany: Blood Glucose Monitor Brands: per insurance. 1 Bottle 3     blood glucose monitoring (FATOU MICROLET) lancets Use to test blood sugar 1 times daily 100 each 3     blood glucose monitoring (NO BRAND SPECIFIED) meter device kit Use to test blood sugar 1 times daily or as directed. Preferred blood glucose meter OR supplies to accompany: Blood Glucose Monitor Brands: per insurance. 1 kit 0     Blood Glucose Monitoring Suppl (CONTOUR NEXT ONE) KIT 1 Device daily 1 kit 0     Calcium Citrate-Vitamin D (CALCIUM + D PO)        Cholecalciferol (VITAMIN D3 PO) Take by mouth daily       Glucos-MSM-C-Ei-Apnopg-Vlowsn (GLUCOSAMINE MSM COMPLEX) TABS tablet Take 1 tablet by mouth daily       levothyroxine (SYNTHROID/LEVOTHROID) 50 MCG tablet Take 1 tablet (50 mcg) by mouth daily 90 tablet 0     multivitamin w/minerals (MULTI-VITAMIN) tablet Take 1 tablet by mouth daily       Omega-3 Fatty Acids (FISH OIL) 500 MG CAPS        Recent Labs   Lab Test 03/11/22  0840 07/09/21  1218 04/09/21  1504 03/02/21  0955 01/31/20  1058   A1C 6.4*  --   --  6.1* 6.1*   LDL 65  --   --  79 84   HDL 63  --   --  66 59   TRIG 142  --   --  155* 96   ALT 43  --   --  36 38   CR 0.62  --   --  0.67 0.60    GFRESTIMATED >90  --   --  >90 >90   GFRESTBLACK  --   --   --  >90 >90   POTASSIUM 3.8  --   --  4.0 4.1   TSH 4.74* 2.00   < > 5.29* 1.76    < > = values in this interval not displayed.        Breast Cancer Screening:  Any new diagnosis of family breast, ovarian, or bowel cancer? No    FHS-7: No flowsheet data found.    Mammogram Screening: Recommended mammography every 1-2 years with patient discussion and risk factor consideration  Pertinent mammograms are reviewed under the imaging tab.    History of abnormal Pap smear: NO - age 30-65 PAP every 5 years with negative HPV co-testing recommended  PAP / HPV Latest Ref Rng & Units 3/2/2021 10/31/2016 5/5/2014   PAP (Historical) - NIL NIL NIL   HPV16 NEG:Negative Negative Negative -   HPV18 NEG:Negative Negative Negative -   HRHPV NEG:Negative Negative Negative -     Reviewed and updated as needed this visit by clinical staff   Tobacco  Allergies  Meds   Med Hx  Surg Hx  Fam Hx  Soc Hx        Reviewed and updated as needed this visit by Provider   Tobacco   Meds   Med Hx  Surg Hx  Fam Hx  Soc Hx         Treadmill 3 times a week.  Hour   Works in [employer]- [employer]-standing a lot for work  Had covid last December     Review of Systems   Constitutional: Negative for chills and fever.   HENT: Negative for congestion, ear pain, hearing loss and sore throat.    Eyes: Negative for pain and visual disturbance.   Respiratory: Negative for cough and shortness of breath.    Cardiovascular: Negative for chest pain and palpitations.   Gastrointestinal: Negative for abdominal pain, constipation, diarrhea, heartburn, hematochezia and nausea.   Breasts:  Negative for tenderness, breast mass and discharge.   Genitourinary: Negative for dysuria, frequency, genital sores, hematuria, pelvic pain, urgency, vaginal bleeding and vaginal discharge.   Musculoskeletal: Negative for arthralgias, joint swelling and myalgias.   Skin: Negative for rash.   Neurological: Negative  "for dizziness, weakness, headaches and paresthesias.   Psychiatric/Behavioral: Negative for mood changes. The patient is not nervous/anxious.           OBJECTIVE:   /70 (BP Location: Right arm, Patient Position: Sitting, Cuff Size: Adult Regular)   Pulse 83   Temp 97.8  F (36.6  C) (Oral)   Resp 19   Ht 1.515 m (4' 11.65\")   Wt 65 kg (143 lb 3.2 oz)   SpO2 99%   BMI 28.30 kg/m    Physical Exam  GENERAL: healthy, alert and no distress  EYES: Eyes grossly normal to inspection, PERRL and conjunctivae and sclerae normal  HENT: ear canals and TM's normal, nose and mouth without ulcers or lesions  NECK: no adenopathy, no asymmetry, masses, or scars and thyroid normal to palpation  RESP: lungs clear to auscultation - no rales, rhonchi or wheezes  BREAST: normal without masses, tenderness or nipple discharge and no palpable axillary masses or adenopathy  CV: regular rate and rhythm, normal S1 S2, no S3 or S4, no murmur, click or rub, no peripheral edema and peripheral pulses strong  ABDOMEN: soft, nontender, no hepatosplenomegaly, no masses and bowel sounds normal   (female): vaginal mucosal atrophy and normal cervix, adnexae, and uterus without masses.  MS: no gross musculoskeletal defects noted, no edema  SKIN: no suspicious lesions or rashes  NEURO: Normal strength and tone, mentation intact and speech normal  PSYCH: mentation appears normal, affect normal/bright  Diabetic foot exam: normal DP and PT pulses, no trophic changes or ulcerative lesions, normal sensory exam and normal monofilament exam    Diagnostic Test Results:    25 OH Vitamin D2 ug/L <5  <5  <5  <5  <5    25 OH Vitamin D3 ug/L 58  60  51  48  44    25 OH Vit D Total 20 - 75 ug/L <63  <65 CM  <56 CM  <53 CM  <49 CM      TSH 0.40 - 4.00 mU/L 4.74 High   2.00  4.03 High   5.13 High   5.29 High   1.76  1.37      Sodium 133 - 144 mmol/L 142  142  141   141  140  141    Potassium 3.4 - 5.3 mmol/L 3.8  4.0  4.1   4.2  4.2  3.9    Chloride 94 - 109 " mmol/L 108  109  109   106  105  107    Carbon Dioxide (CO2) 20 - 32 mmol/L 29  32  29   29  29  28    Anion Gap 3 - 14 mmol/L 5  1 Low   3   6  6  6    Urea Nitrogen 7 - 30 mg/dL 15  20  19   12  15  19    Creatinine 0.52 - 1.04 mg/dL 0.62  0.67  0.60   0.65  0.58  0.62    Calcium 8.5 - 10.1 mg/dL 9.2  9.0  8.9   9.0  9.1  8.9    Glucose 70 - 99 mg/dL 130 High   124 High  CM  132 High  CM  138 High  CM  127 High  CM  129 High  CM  121 High     Alkaline Phosphatase 40 - 150 U/L 55          AST 0 - 45 U/L 23  17  20   25  28  18    ALT 0 - 50 U/L 43  36  38   40  48  31    Protein Total 6.8 - 8.8 g/dL 7.4  7.5  7.5   7.9  7.7  7.3    Albumin 3.4 - 5.0 g/dL 3.6  3.9  3.8   3.8  3.7  3.7    Bilirubin Total 0.2 - 1.3 mg/dL 0.7  0.5  0.5   0.4  0.4  0.3    GFR Estimate >60 mL/min/1.73m2 >90  >90 R, CM  >90 R, CM   >90 R, CM  >90 R, CM  >90         Cholesterol <200 mg/dL 156  176  162  165  177  158  166 CM    Triglycerides <150 mg/dL 142  155 High  CM  96 CM  184 High  CM  264 High  CM  173 High  CM  98 R, CM    Direct Measure HDL >=50 mg/dL 63  66 R  59 R  53 R  52 R  55 R  60 R    LDL Cholesterol Calculated <=100 mg/dL 65  79 R, CM  84 R, CM  75 R, CM  72 R, CM  68 R, CM  86 R, CM    Non HDL Cholesterol <130 mg/dL 93  110  103  112  125  103     Patient Fasting > 8hrs?  Yes              ASSESSMENT/PLAN:   (Z00.00) Routine general medical examination at a health care facility  (primary encounter diagnosis)  Comment: normal exam  Plan:     (E11.9) Type 2 diabetes mellitus without complication, without long-term current use of insulin (H)  Comment: A1C 6.4.  Diabetes well controlled without medication  Plan: Albumin Random Urine Quantitative with Creat         Ratio, FOOT EXAM            (E03.8,  E06.3) Hypothyroidism due to Hashimoto's thyroiditis  Comment: has not been taking thyroid medication.  Restart levothyroxine and recheck labs in 8 weeks   Plan: levothyroxine (SYNTHROID/LEVOTHROID) 50 MCG         tablet, TSH  "with free T4 reflex            (E78.5) Hyperlipidemia LDL goal <100  Comment: lipids at goal with lipitor 20 mg daily- was taking 1/2 tablet of 40 mg - switched to 20 mg tablet daily  Plan: atorvastatin (LIPITOR) 20 MG tablet            (Z12.31) Visit for screening mammogram  Comment: will schedule mammogram   Plan: MA SCREENING DIGITAL BILAT - Future  (s+30)            (Z12.11) Screening for colon cancer  Comment: declines colonoscopy - agreeable to fit testing   Plan: Fecal colorectal cancer screen (FIT)              Patient has been advised of split billing requirements and indicates understanding: Yes    COUNSELING:  Reviewed preventive health counseling, as reflected in patient instructions       Regular exercise       Healthy diet/nutrition       Vision screening       Immunizations    Declined: covid, Influenza, Pneumococcal, TDAP and Zoster due to Concerns about side effects/safety            Estimated body mass index is 26.15 kg/m  as calculated from the following:    Height as of 3/2/21: 1.53 m (5' 0.25\").    Weight as of 3/2/21: 61.2 kg (135 lb).    Weight management plan: Discussed healthy diet and exercise guidelines    She reports that she has never smoked. She has never used smokeless tobacco.      Counseling Resources:  ATP IV Guidelines  Pooled Cohorts Equation Calculator  Breast Cancer Risk Calculator  BRCA-Related Cancer Risk Assessment: FHS-7 Tool  FRAX Risk Assessment  ICSI Preventive Guidelines  Dietary Guidelines for Americans, 2010  USDA's MyPlate  ASA Prophylaxis  Lung CA Screening    Nancy Carver PA-C  [facility]  "

## 2022-03-18 NOTE — LETTER
March 18, 2022      Mercy Pino  [address] [place] [address]              Dear Mercy   Your urine does not show excess protein.   Please call or MyChart my office with any questions or concerns.   Nancy Carver, PAC       Resulted Orders   Albumin Random Urine Quantitative with Creat Ratio   Result Value Ref Range    Creatinine Urine mg/dL 74 mg/dL    Albumin Urine mg/L 5 mg/L    Albumin Urine mg/g Cr 6.76 0.00 - 25.00 mg/g Cr

## 2022-03-18 NOTE — PATIENT INSTRUCTIONS
Schedule your mammogram at [facility] ([phone]).    Schedule dilated eye exam as soon as possible  Take levothyroxine 50 mcg daily and schedule lab only appointment I approximately 8 weeks no need to fast  Continue lipitor 20 mg daily   Continue supplements as you have been taking  Bring in FIT test for colon cancer screening   Patient Education        Preventive Health Recommendations  Female Ages 50 - 64    Yearly exam: See your health care provider every year in order to  o Review health changes.   o Discuss preventive care.    o Review your medicines if your doctor has prescribed any.      Get a Pap test every three years (unless you have an abnormal result and your provider advises testing more often).    If you get Pap tests with HPV test, you only need to test every 5 years, unless you have an abnormal result.     You do not need a Pap test if your uterus was removed (hysterectomy) and you have not had cancer.    You should be tested each year for STDs (sexually transmitted diseases) if you're at risk.     Have a mammogram every 1 to 2 years.    Have a colonoscopy at age 50, or have a yearly FIT test (stool test). These exams screen for colon cancer.      Have a cholesterol test every 5 years, or more often if advised.    Have a diabetes test (fasting glucose) every three years. If you are at risk for diabetes, you should have this test more often.     If you are at risk for osteoporosis (brittle bone disease), think about having a bone density scan (DEXA).    Shots: Get a flu shot each year. Get a tetanus shot every 10 years.    Nutrition:     Eat at least 5 servings of fruits and vegetables each day.    Eat whole-grain bread, whole-wheat pasta and brown rice instead of white grains and rice.    Get adequate Calcium and Vitamin D.     Lifestyle    Exercise at least 150 minutes a week (30 minutes a day, 5 days a week). This will help you control your weight and  prevent disease.    Limit alcohol to one drink per day.    No smoking.     Wear sunscreen to prevent skin cancer.     See your dentist every six months for an exam and cleaning.    See your eye doctor every 1 to 2 years.

## 2022-03-18 NOTE — RESULT ENCOUNTER NOTE
Please send letter with lab results     Dear Mercy  Your urine does not show excess protein.  Please call or MyChart my office with any questions or concerns.   Nancy Carver, PAC

## 2022-04-01 ENCOUNTER — ANCILLARY PROCEDURE (OUTPATIENT)
Dept: MAMMOGRAPHY | Facility: CLINIC | Age: 67
End: 2022-04-01
Attending: PHYSICIAN ASSISTANT
Payer: COMMERCIAL

## 2022-04-01 DIAGNOSIS — Z12.31 VISIT FOR SCREENING MAMMOGRAM: ICD-10-CM

## 2022-04-01 PROCEDURE — 77067 SCR MAMMO BI INCL CAD: CPT | Mod: GC | Performed by: RADIOLOGY

## 2022-06-02 ENCOUNTER — LAB (OUTPATIENT)
Dept: LAB | Facility: CLINIC | Age: 67
End: 2022-06-02
Payer: COMMERCIAL

## 2022-06-02 DIAGNOSIS — E06.3 HYPOTHYROIDISM DUE TO HASHIMOTO'S THYROIDITIS: ICD-10-CM

## 2022-06-02 LAB — TSH SERPL DL<=0.005 MIU/L-ACNC: 0.89 MU/L (ref 0.4–4)

## 2022-06-02 PROCEDURE — 36415 COLL VENOUS BLD VENIPUNCTURE: CPT

## 2022-06-02 PROCEDURE — 84443 ASSAY THYROID STIM HORMONE: CPT

## 2022-06-02 NOTE — LETTER
Mirela 3, 2022      Mercy Johnsonhbun  [address] [place] [address]        Dear ,    We are writing to inform you of your test results.    Your test results fall within the expected range(s) or remain unchanged from previous results.  Please continue with current treatment plan.    Resulted Orders   TSH with free T4 reflex   Result Value Ref Range    TSH 0.89 0.40 - 4.00 mU/L       If you have any questions or concerns, please call the clinic at the number listed above.       Sincerely,      Nancy Carver PA-C

## 2022-06-03 NOTE — RESULT ENCOUNTER NOTE
Please mail results and note to patient:    Mercy,  Your thyroid level looks just fine.  TANK Schmidt M.D.

## 2022-12-01 ENCOUNTER — TRANSFERRED RECORDS (OUTPATIENT)
Dept: MULTI SPECIALTY CLINIC | Facility: CLINIC | Age: 67
End: 2022-12-01

## 2022-12-01 LAB — RETINOPATHY: NORMAL

## 2023-01-03 DIAGNOSIS — E06.3 HYPOTHYROIDISM DUE TO HASHIMOTO'S THYROIDITIS: ICD-10-CM

## 2023-01-03 RX ORDER — LEVOTHYROXINE SODIUM 50 UG/1
TABLET ORAL
Qty: 90 TABLET | Refills: 0 | Status: SHIPPED | OUTPATIENT
Start: 2023-01-03 | End: 2023-04-03

## 2023-03-22 ENCOUNTER — LAB (OUTPATIENT)
Dept: URGENT CARE | Facility: URGENT CARE | Age: 68
End: 2023-03-22
Attending: PHYSICIAN ASSISTANT
Payer: COMMERCIAL

## 2023-03-22 ENCOUNTER — VIRTUAL VISIT (OUTPATIENT)
Dept: FAMILY MEDICINE | Facility: OTHER | Age: 68
End: 2023-03-22
Payer: COMMERCIAL

## 2023-03-22 DIAGNOSIS — R05.1 ACUTE COUGH: ICD-10-CM

## 2023-03-22 DIAGNOSIS — J02.9 SORE THROAT: Primary | ICD-10-CM

## 2023-03-22 DIAGNOSIS — J02.9 SORE THROAT: ICD-10-CM

## 2023-03-22 LAB
DEPRECATED S PYO AG THROAT QL EIA: NEGATIVE
FLUAV RNA SPEC QL NAA+PROBE: NEGATIVE
FLUBV RNA RESP QL NAA+PROBE: NEGATIVE
RSV RNA SPEC NAA+PROBE: NEGATIVE
SARS-COV-2 RNA RESP QL NAA+PROBE: NEGATIVE

## 2023-03-22 PROCEDURE — 87651 STREP A DNA AMP PROBE: CPT

## 2023-03-22 PROCEDURE — 99213 OFFICE O/P EST LOW 20 MIN: CPT | Mod: 93 | Performed by: PHYSICIAN ASSISTANT

## 2023-03-22 PROCEDURE — 87637 SARSCOV2&INF A&B&RSV AMP PRB: CPT

## 2023-03-22 NOTE — PROGRESS NOTES
Mercy is a 67 year old who is being evaluated via a billable telephone visit.      What phone number would you like to be contacted at? [phone]   How would you like to obtain your AVS? Mail a copy  Distant Location (provider location):  Off-site    Assessment & Plan     ICD-10-CM    1. Sore throat  J02.9 Streptococcus A Rapid Screen w/Reflex to PCR - Clinic Collect     Symptomatic COVID-19 Virus (Coronavirus) by PCR Nose     Symptomatic Influenza A/B, RSV, & SARS-CoV2 PCR (COVID-19) Nose      2. Acute cough  R05.1 Streptococcus A Rapid Screen w/Reflex to PCR - Clinic Collect     Symptomatic COVID-19 Virus (Coronavirus) by PCR Nose     Symptomatic Influenza A/B, RSV, & SARS-CoV2 PCR (COVID-19) Nose          Mercy a 67 y.o female presents with the complaint of congestion, non-productive cough, and sore throat. She is still having persisting symptoms of congestion and cough but her sore throat has mostly resolved. She has wheezing or fever/chills.    She is ordered a strep swab and a COVID/influenza/RSV test. For  now supportive treatment is recommended and can continue to use the nyquil and ibuprofen for symptomatic relief. She is rest and hydrate efficiently.      Review of the result(s) of each unique test - See list       3/11/22 - A1C   Diagnosis or treatment significantly limited by social determinants of health - None     15 minutes spent on the date of the encounter doing chart review, history and exam, documentation and further activities as noted above    The patient indicates understanding of these issues and agrees with the plan.    I, Zan Adams PA-C,  was present with the PA student who participated in the service and in the documentation of the note.  I have verified the history and personally performed the physical exam and medical decision making.  I agree with the assessment and plan of care as documented in the note.     JOSE Conti-S   [facility]      ESTHER [facility] ARIANNA Madrid is a 67 year old, presenting for the following health issues:  Pharyngitis  No flowsheet data found.  Mercy a 67 y.o female patient presents with the complaint of sore throat that started about 3 days ago but seem to have resolved today. She states that she has some congestion, non-productive cough and myalgia. She denies any fever, rhinorrhea or fatigue. She has been using nyquil and ibuprofen which has been alleviating her symptoms. She has had some sick contacts but had a negative COVID test yesterday. Of note, she endorses having some ear pain over a week ago but has resolved since then.    History of Present Illness       Reason for visit:  Strep  Symptom onset:  1-3 days ago  Symptoms include:  Sore throat, congestions, stuffy nose  Symptom intensity:  Moderate  Symptom progression:  Improving  Had these symptoms before:  No  What makes it worse:  No  What makes it better:  No    She eats 2-3 servings of fruits and vegetables daily.She consumes 1 sweetened beverage(s) daily.She exercises with enough effort to increase her heart rate 9 or less minutes per day.  She exercises with enough effort to increase her heart rate 3 or less days per week.   She is taking medications regularly.       Acute Illness  Acute illness concerns: sore throat  Onset/Duration: 3 days  Symptoms: congestion, sore throat resolved today  Fever: No  Chills/Sweats: No  Headache (location?): No  Sinus Pressure: No  Conjunctivitis:  No  Ear Pain: no  Rhinorrhea: No  Congestion: YES  Sore Throat: YES but resolved today  Cough: YES-non-productive  Wheeze: No  Decreased Appetite: No  Nausea: No  Vomiting: No  Diarrhea: No  Fatigue/Achiness: YES to achiness  Sick/Strep Exposure: YES just sick contacts with  and children but no positive tests  Therapies tried and outcome: nyquil and ibuprofen have helped          Review of Systems    All other systems reviewed and are negative.     Constitutional, HEENT, cardiovascular, pulmonary, gi and gu systems are negative, except as otherwise noted.      Objective       Vitals:  No vitals were obtained today due to virtual visit.    Physical Exam   alert and no distress  PSYCH: Alert and oriented times 3; coherent speech, normal   rate and volume, able to articulate logical thoughts, able   to abstract reason, no tangential thoughts, no hallucinations   or delusions  Her affect is normal  RESP: Cough, no audible wheezing, able to talk in full sentences  Remainder of exam unable to be completed due to telephone visits    Diagnostics: see orders pending in Epic       Phone call duration: 9 minutes

## 2023-03-23 LAB — GROUP A STREP BY PCR: NOT DETECTED

## 2023-03-23 NOTE — RESULT ENCOUNTER NOTE
Please call patient with the following message    Testing was negative for COVID, Strep, RSV, and Flu    Zan Adams PA-C

## 2023-05-02 ENCOUNTER — ANCILLARY ORDERS (OUTPATIENT)
Dept: FAMILY MEDICINE | Facility: CLINIC | Age: 68
End: 2023-05-02

## 2023-05-02 DIAGNOSIS — Z12.31 VISIT FOR SCREENING MAMMOGRAM: ICD-10-CM

## 2023-05-26 ENCOUNTER — OFFICE VISIT (OUTPATIENT)
Dept: FAMILY MEDICINE | Facility: CLINIC | Age: 68
End: 2023-05-26
Payer: COMMERCIAL

## 2023-05-26 VITALS
RESPIRATION RATE: 11 BRPM | WEIGHT: 141.1 LBS | DIASTOLIC BLOOD PRESSURE: 67 MMHG | OXYGEN SATURATION: 100 % | BODY MASS INDEX: 27.7 KG/M2 | TEMPERATURE: 96.1 F | HEART RATE: 81 BPM | SYSTOLIC BLOOD PRESSURE: 112 MMHG | HEIGHT: 60 IN

## 2023-05-26 DIAGNOSIS — E28.39 ESTROGEN DEFICIENCY: ICD-10-CM

## 2023-05-26 DIAGNOSIS — Z00.00 ENCOUNTER FOR MEDICARE ANNUAL WELLNESS EXAM: Primary | ICD-10-CM

## 2023-05-26 DIAGNOSIS — Z23 NEED FOR DIPHTHERIA-TETANUS-PERTUSSIS (TDAP) VACCINE: ICD-10-CM

## 2023-05-26 DIAGNOSIS — E11.9 TYPE 2 DIABETES MELLITUS WITHOUT COMPLICATION, WITHOUT LONG-TERM CURRENT USE OF INSULIN (H): ICD-10-CM

## 2023-05-26 DIAGNOSIS — Z00.00 ROUTINE GENERAL MEDICAL EXAMINATION AT A HEALTH CARE FACILITY: ICD-10-CM

## 2023-05-26 DIAGNOSIS — E06.3 HYPOTHYROIDISM DUE TO HASHIMOTO'S THYROIDITIS: ICD-10-CM

## 2023-05-26 DIAGNOSIS — E78.5 HYPERLIPIDEMIA LDL GOAL <100: ICD-10-CM

## 2023-05-26 DIAGNOSIS — Z12.11 SCREEN FOR COLON CANCER: ICD-10-CM

## 2023-05-26 LAB
ALBUMIN SERPL BCG-MCNC: 4.5 G/DL (ref 3.5–5.2)
ALP SERPL-CCNC: 65 U/L (ref 35–104)
ALT SERPL W P-5'-P-CCNC: 35 U/L (ref 10–35)
ANION GAP SERPL CALCULATED.3IONS-SCNC: 10 MMOL/L (ref 7–15)
AST SERPL W P-5'-P-CCNC: 26 U/L (ref 10–35)
BILIRUB SERPL-MCNC: 0.5 MG/DL
BUN SERPL-MCNC: 15.3 MG/DL (ref 8–23)
CALCIUM SERPL-MCNC: 9.4 MG/DL (ref 8.8–10.2)
CHLORIDE SERPL-SCNC: 106 MMOL/L (ref 98–107)
CHOLEST SERPL-MCNC: 174 MG/DL
CREAT SERPL-MCNC: 0.59 MG/DL (ref 0.51–0.95)
CREAT UR-MCNC: 132 MG/DL
DEPRECATED HCO3 PLAS-SCNC: 26 MMOL/L (ref 22–29)
GFR SERPL CREATININE-BSD FRML MDRD: >90 ML/MIN/1.73M2
GLUCOSE SERPL-MCNC: 161 MG/DL (ref 70–99)
HBA1C MFR BLD: 6.6 % (ref 0–5.6)
HDLC SERPL-MCNC: 56 MG/DL
LDLC SERPL CALC-MCNC: 87 MG/DL
MICROALBUMIN UR-MCNC: <12 MG/L
MICROALBUMIN/CREAT UR: NORMAL MG/G{CREAT}
NONHDLC SERPL-MCNC: 118 MG/DL
POTASSIUM SERPL-SCNC: 4.2 MMOL/L (ref 3.4–5.3)
PROT SERPL-MCNC: 6.9 G/DL (ref 6.4–8.3)
SODIUM SERPL-SCNC: 142 MMOL/L (ref 136–145)
TRIGL SERPL-MCNC: 157 MG/DL
TSH SERPL DL<=0.005 MIU/L-ACNC: 2.46 UIU/ML (ref 0.3–4.2)

## 2023-05-26 PROCEDURE — 99214 OFFICE O/P EST MOD 30 MIN: CPT | Mod: 25 | Performed by: PHYSICIAN ASSISTANT

## 2023-05-26 PROCEDURE — 80053 COMPREHEN METABOLIC PANEL: CPT | Performed by: PHYSICIAN ASSISTANT

## 2023-05-26 PROCEDURE — 82570 ASSAY OF URINE CREATININE: CPT | Performed by: PHYSICIAN ASSISTANT

## 2023-05-26 PROCEDURE — 83036 HEMOGLOBIN GLYCOSYLATED A1C: CPT | Performed by: PHYSICIAN ASSISTANT

## 2023-05-26 PROCEDURE — 99397 PER PM REEVAL EST PAT 65+ YR: CPT | Performed by: PHYSICIAN ASSISTANT

## 2023-05-26 PROCEDURE — 36415 COLL VENOUS BLD VENIPUNCTURE: CPT | Performed by: PHYSICIAN ASSISTANT

## 2023-05-26 PROCEDURE — 84443 ASSAY THYROID STIM HORMONE: CPT | Performed by: PHYSICIAN ASSISTANT

## 2023-05-26 PROCEDURE — 82043 UR ALBUMIN QUANTITATIVE: CPT | Performed by: PHYSICIAN ASSISTANT

## 2023-05-26 PROCEDURE — 80061 LIPID PANEL: CPT | Performed by: PHYSICIAN ASSISTANT

## 2023-05-26 RX ORDER — LEVOTHYROXINE SODIUM 50 UG/1
50 TABLET ORAL DAILY
Qty: 30 TABLET | Refills: 0 | Status: SHIPPED | OUTPATIENT
Start: 2023-05-26 | End: 2023-05-27

## 2023-05-26 ASSESSMENT — PAIN SCALES - GENERAL: PAINLEVEL: NO PAIN (0)

## 2023-05-26 NOTE — LETTER
May 30, 2023      [facility]  [address] [place] [address]        Dear ,    We are writing to inform you of your test results.    Dear Mercy   Your cholesterol is acceptable on the lipitor.   Your electrolytes, kidney function and liver function were normal.       Your blood sugar was 161.   Your A1C indicates that your diabetes is controlled.   Your urine does not show excess protein.   Your thyroid function was normal on your current dose of levothyroxine.   I have sent over a year of prescriptions.   Please call or MyChart my office with any questions or concerns.   Given your diabetes we should see you in clinic in 6 months.     Resulted Orders   HEMOGLOBIN A1C   Result Value Ref Range    Hemoglobin A1C 6.6 (H) 0.0 - 5.6 %      Comment:      Normal <5.7%   Prediabetes 5.7-6.4%    Diabetes 6.5% or higher     Note: Adopted from ADA consensus guidelines.   Lipid panel reflex to direct LDL Fasting   Result Value Ref Range    Cholesterol 174 <200 mg/dL    Triglycerides 157 (H) <150 mg/dL    Direct Measure HDL 56 >=50 mg/dL    LDL Cholesterol Calculated 87 <=100 mg/dL    Non HDL Cholesterol 118 <130 mg/dL    Narrative    Cholesterol  Desirable:  <200 mg/dL    Triglycerides  Normal:  Less than 150 mg/dL  Borderline High:  150-199 mg/dL  High:  200-499 mg/dL  Very High:  Greater than or equal to 500 mg/dL    Direct Measure HDL  Female:  Greater than or equal to 50 mg/dL   Male:  Greater than or equal to 40 mg/dL    LDL Cholesterol  Desirable:  <100mg/dL  Above Desirable:  100-129 mg/dL   Borderline High:  130-159 mg/dL   High:  160-189 mg/dL   Very High:  >= 190 mg/dL    Non HDL Cholesterol  Desirable:  130 mg/dL  Above Desirable:  130-159 mg/dL  Borderline High:  160-189 mg/dL  High:  190-219 mg/dL  Very High:  Greater than or equal to 220 mg/dL   Albumin Random Urine Quantitative with Creat Ratio   Result Value Ref Range    Creatinine Urine mg/dL 132.0 mg/dL      Comment:      The  reference ranges have not been established in urine creatinine. The results should be integrated into the clinical context for interpretation.    Albumin Urine mg/L <12.0 mg/L      Comment:      The reference ranges have not been established in urine albumin. The results should be integrated into the clinical context for interpretation.    Albumin Urine mg/g Cr        Comment:      Unable to calculate, urine albumin and/or urine creatinine is outside detectable limits.  Microalbuminuria is defined as an albumin:creatinine ratio of 17 to 299 for males and 25 to 299 for females. A ratio of albumin:creatinine of 300 or higher is indicative of overt proteinuria.  Due to biologic variability, positive results should be confirmed by a second, first-morning random or 24-hour timed urine specimen. If there is discrepancy, a third specimen is recommended. When 2 out of 3 results are in the microalbuminuria range, this is evidence for incipient nephropathy and warrants increased efforts at glucose control, blood pressure control, and institution of therapy with an angiotensin-converting-enzyme (ACE) inhibitor (if the patient can tolerate it).     TSH WITH FREE T4 REFLEX   Result Value Ref Range    TSH 2.46 0.30 - 4.20 uIU/mL   Comprehensive metabolic panel (BMP + Alb, Alk Phos, ALT, AST, Total. Bili, TP)   Result Value Ref Range    Sodium 142 136 - 145 mmol/L    Potassium 4.2 3.4 - 5.3 mmol/L    Chloride 106 98 - 107 mmol/L    Carbon Dioxide (CO2) 26 22 - 29 mmol/L    Anion Gap 10 7 - 15 mmol/L    Urea Nitrogen 15.3 8.0 - 23.0 mg/dL    Creatinine 0.59 0.51 - 0.95 mg/dL    Calcium 9.4 8.8 - 10.2 mg/dL    Glucose 161 (H) 70 - 99 mg/dL    Alkaline Phosphatase 65 35 - 104 U/L    AST 26 10 - 35 U/L    ALT 35 10 - 35 U/L    Protein Total 6.9 6.4 - 8.3 g/dL    Albumin 4.5 3.5 - 5.2 g/dL    Bilirubin Total 0.5 <=1.2 mg/dL    GFR Estimate >90 >60 mL/min/1.73m2      Comment:      eGFR calculated using 2021 CKD-EPI equation.       If  you have any questions or concerns, please call the clinic at the number listed above.       Sincerely,      Nancy Carver PA-C

## 2023-05-26 NOTE — Clinical Note
Please abstract the following data from this visit with this patient into the appropriate field in Epic:  Tests that can be patient reported without a hard copy:  Eye exam with ophthalmology on this date: 12/2022 Exam Location: [facility]  Other Tests found in the patient's chart through Chart Review/Care Everywhere:  {Abstract Quality List (Optional):866904}  Note to Abstraction: If this section is blank, no results were found via Chart Review/Care Everywhere.

## 2023-05-26 NOTE — PATIENT INSTRUCTIONS
Schedule dexa scan at [facility] ([phone]) formerly called [facility].   Check into insurance coverage.  I have ordered it for estrogen deficiency  You may do it same day as mammogram  Follow up with us in clinic in 6 months   Get the tdap vaccine at your pharmacy - it is covered by medicare at your pharmacy   Patient Education       Preventive Health Recommendations    See your health care provider every year to  Review health changes.   Discuss preventive care.    Review your medicines if your doctor has prescribed any.    You no longer need a yearly Pap test unless you've had an abnormal Pap test in the past 10 years. If you have vaginal symptoms, such as bleeding or discharge, be sure to talk with your provider about a Pap test.    Every 1 to 2 years, have a mammogram.  If you are over 69, talk with your health care provider about whether or not you want to continue having screening mammograms.    Every 10 years, have a colonoscopy. Or, have a yearly FIT test (stool test). These exams will check for colon cancer.     Have a cholesterol test every 5 years, or more often if your doctor advises it.     Have a diabetes test (fasting glucose) every three years. If you are at risk for diabetes, you should have this test more often.     At age 65, have a bone density scan (DEXA) to check for osteoporosis (brittle bone disease).    Shots:  Get a flu shot each year.  Get a tetanus shot every 10 years.  Talk to your doctor about your pneumonia vaccines. There are now two you should receive - Pneumovax (PPSV 23) and Prevnar (PCV 13).  Talk to your pharmacist about the shingles vaccine.  Talk to your doctor about the hepatitis B vaccine.    Nutrition:   Eat at least 5 servings of fruits and vegetables each day.    Eat whole-grain bread, whole-wheat pasta and brown rice instead of white grains and rice.    Get adequate about Calcium and Vitamin D.     Lifestyle  Exercise at least 150  minutes a week (30 minutes a day, 5 days a week). This will help you control your weight and prevent disease.    Limit alcohol to one drink per day.    No smoking.     Wear sunscreen to prevent skin cancer.     See your dentist twice a year for an exam and cleaning.    See your eye doctor every 1 to 2 years to screen for conditions such as glaucoma, macular degeneration, cataracts, etc.    Personalized Prevention Plan  You are due for the preventive services outlined below.  Your care team is available to assist you in scheduling these services.  If you have already completed any of these items, please share that information with your care team to update in your medical record.    Health Maintenance Due   Topic Date Due    Pneumococcal Vaccine (1 - PCV) Never done    Zoster (Shingles) Vaccine (1 of 2) Never done    Eye Exam  08/23/2014    Diptheria Tetanus Pertussis (DTAP/TDAP/TD) Vaccine (2 - Td or Tdap) 03/02/2017    COVID-19 Vaccine (2 - Booster for Carina series) 07/18/2021    Colorectal Cancer Screening  05/26/2022    Flu Vaccine (1) Never done    A1C Lab  09/11/2022    Basic Metabolic Panel  03/11/2023    Cholesterol Lab  03/11/2023    Annual Wellness Visit  03/18/2023    Kidney Microalbumin Urine Test  03/18/2023    Diabetic Foot Exam  03/18/2023    Mammogram  04/01/2023    Thyroid Function Lab  06/02/2023     Patient Education   Personalized Prevention Plan  You are due for the preventive services outlined below.  Your care team is available to assist you in scheduling these services.  If you have already completed any of these items, please share that information with your care team to update in your medical record.  Health Maintenance Due   Topic Date Due    Pneumococcal Vaccine (1 - PCV) Never done    Zoster (Shingles) Vaccine (1 of 2) Never done    Eye Exam  08/23/2014    Diptheria Tetanus Pertussis (DTAP/TDAP/TD) Vaccine (2 - Td or Tdap) 03/02/2017    COVID-19 Vaccine (2 - Booster for Carina series)  07/18/2021    Colorectal Cancer Screening  05/26/2022    Flu Vaccine (1) Never done    A1C Lab  09/11/2022    Basic Metabolic Panel  03/11/2023    Cholesterol Lab  03/11/2023    Annual Wellness Visit  03/18/2023    Kidney Microalbumin Urine Test  03/18/2023    Diabetic Foot Exam  03/18/2023    Mammogram  04/01/2023    Thyroid Function Lab  06/02/2023

## 2023-05-26 NOTE — PROGRESS NOTES
SUBJECTIVE:   CC: Mercy is an 68 year old who presents for preventive health visit.       2023     7:06 AM   Additional Questions   Roomed by Jackeline DUPONT     Patient has been advised of split billing requirements and indicates understanding: Yes  HPI                    Social History     Tobacco Use     Smoking status: Never     Smokeless tobacco: Never     Tobacco comments:     smoke free household.   Vaping Use     Vaping status: Never Used   Substance Use Topics     Alcohol use: No             3/18/2022     7:04 AM   Alcohol Use   Prescreen: >3 drinks/day or >7 drinks/week? Not Applicable     Reviewed orders with patient.  Reviewed health maintenance and updated orders accordingly - Yes  BP Readings from Last 3 Encounters:   23 112/67   22 112/70   21 121/81    Wt Readings from Last 3 Encounters:   23 64 kg (141 lb 1.6 oz)   22 65 kg (143 lb 3.2 oz)   21 61.2 kg (135 lb)                  Patient Active Problem List   Diagnosis     Dermatophytosis of nail     Contact dermatitis and other eczema, due to unspecified cause     Plantar fascial fibromatosis     Family history of diabetes mellitus     Hyperlipidemia LDL goal <100     Pre-diabetes     Cervical polyp     Anxiety     Food allergies     Advanced directives, counseling/discussion     Health Care Home     Osteopenia     Bacterial keratitis, os     Corneal erosion syndrome?     Overweight     Type 2 diabetes mellitus without complication, without long-term current use of insulin (H)     Infection due to 2019 novel coronavirus     Hypothyroidism due to Hashimoto's thyroiditis     Past Surgical History:   Procedure Laterality Date      SECTION      x2     LUMPECTOMY BREAST  age 23    benign       Social History     Tobacco Use     Smoking status: Never     Smokeless tobacco: Never     Tobacco comments:     smoke free household.   Vaping Use     Vaping status: Never Used   Substance Use Topics     Alcohol use: No      Family History   Problem Relation Age of Onset     Cancer Mother         pancreatic cancer,  60     Diabetes Mother      Thyroid Disease Mother      Diabetes Maternal Grandmother      Breast Cancer Sister      Thyroid Disease Sister      Asthma No family hx of      C.A.D. No family hx of      Hypertension No family hx of      Cerebrovascular Disease No family hx of      Cancer - colorectal No family hx of      Prostate Cancer No family hx of          Current Outpatient Medications   Medication Sig Dispense Refill     alcohol swab prep pads Use to swab area of injection/melody as directed. 100 each 3     atorvastatin (LIPITOR) 20 MG tablet Take 1 tablet (20 mg) by mouth daily 90 tablet 3     blood glucose (NO BRAND SPECIFIED) test strip Use to test blood sugar 1 times daily or as directed. To accompany: Blood Glucose Monitor Brands: per insurance. 100 strip 6     blood glucose calibration (NO BRAND SPECIFIED) solution To accompany: Blood Glucose Monitor Brands: per insurance. 1 Bottle 3     blood glucose monitoring ([employer]) lancets Use to test blood sugar 1 times daily 100 each 3     blood glucose monitoring (NO BRAND SPECIFIED) meter device kit Use to test blood sugar 1 times daily or as directed. Preferred blood glucose meter OR supplies to accompany: Blood Glucose Monitor Brands: per insurance. 1 kit 0     Blood Glucose Monitoring Suppl (CONTOUR NEXT ONE) KIT 1 Device daily 1 kit 0     Calcium Citrate-Vitamin D (CALCIUM + D PO)        Cholecalciferol (VITAMIN D3 PO) Take by mouth daily       Glucos-MSM-C-Hx-Oxxycr-Ygfysh (GLUCOSAMINE MSM COMPLEX) TABS tablet Take 1 tablet by mouth daily       levothyroxine (SYNTHROID/LEVOTHROID) 50 MCG tablet Take 1 tablet (50 mcg) by mouth daily 90 tablet 3     multivitamin w/minerals (THERA-VIT-M) tablet Take 1 tablet by mouth daily       Omega-3 Fatty Acids (FISH OIL) 500 MG CAPS          Breast Cancer Screening:  Any new diagnosis of family breast, ovarian, or  bowel cancer? No    FHS-7:       4/1/2022    10:29 AM   Breast CA Risk Assessment (FHS-7)   Did any of your first-degree relatives have breast or ovarian cancer? Yes   Did any of your relatives have bilateral breast cancer? No   Did any man in your family have breast cancer? No   Did any woman in your family have breast and ovarian cancer? No   Did any woman in your family have breast cancer before age 50 y? Yes   Do you have 2 or more relatives with breast and/or ovarian cancer? No   Do you have 2 or more relatives with breast and/or bowel cancer? No       Mammogram Screening: Recommended mammography every 1-2 years with patient discussion and risk factor consideration  Pertinent mammograms are reviewed under the imaging tab.    History of abnormal Pap smear: NO - age 65 - see link Cervical Cytology Screening Guidelines      Latest Ref Rng & Units 3/2/2021     9:30 AM 3/2/2021     9:25 AM 10/31/2016     8:13 AM   PAP / HPV   PAP (Historical)   NIL      HPV 16 DNA NEG^Negative Negative    Negative     HPV 18 DNA NEG^Negative Negative    Negative     Other HR HPV NEG^Negative Negative    Negative       Reviewed and updated as needed this visit by clinical staff   Tobacco  Allergies  Meds              Reviewed and updated as needed this visit by Provider   Tobacco   Meds   Med Hx  Surg Hx  Fam Hx  Soc Hx         Sometimes knee pain- declines further evaluation.  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   Had eye exam [facility]- December   History of diabetes- doesn't check blood sugars and no medications   Review of Systems  CONSTITUTIONAL: NEGATIVE for fever, chills, change in weight  INTEGUMENTARY/SKIN: NEGATIVE for worrisome rashes, moles or lesions  EYES: NEGATIVE for vision changes or irritation  ENT: NEGATIVE for ear, mouth and throat problems  RESP: NEGATIVE for significant cough or SOB  BREAST: NEGATIVE for masses, tenderness or discharge  CV:  "NEGATIVE for chest pain, palpitations or peripheral edema  GI: NEGATIVE for nausea, abdominal pain, heartburn, or change in bowel habits  : NEGATIVE for unusual urinary or vaginal symptoms. No vaginal bleeding.  MUSCULOSKELETAL:knee pain  NEURO: NEGATIVE for weakness, dizziness or paresthesias  PSYCHIATRIC: NEGATIVE for changes in mood or affect      OBJECTIVE:   /67 (BP Location: Right arm, Patient Position: Sitting, Cuff Size: Adult Regular)   Pulse 81   Temp (!) 96.1  F (35.6  C) (Tympanic)   Resp 11   Ht 1.516 m (4' 11.69\")   Wt 64 kg (141 lb 1.6 oz)   SpO2 100%   BMI 27.85 kg/m    Physical Exam  GENERAL: healthy, alert and no distress  EYES: Eyes grossly normal to inspection, PERRL and conjunctivae and sclerae normal  HENT: ear canals and TM's normal, nose and mouth without ulcers or lesions  NECK: no adenopathy, no asymmetry, masses, or scars and thyroid normal to palpation  RESP: lungs clear to auscultation - no rales, rhonchi or wheezes  BREAST: normal without masses, tenderness or nipple discharge and no palpable axillary masses or adenopathy  CV: regular rate and rhythm, normal S1 S2, no S3 or S4, no murmur, click or rub, no peripheral edema and peripheral pulses strong  ABDOMEN: soft, nontender, no hepatosplenomegaly, no masses and bowel sounds normal  MS: no gross musculoskeletal defects noted, no edema  SKIN: no suspicious lesions or rashes  NEURO: Normal strength and tone, mentation intact and speech normal  PSYCH: mentation appears normal, affect normal/bright    Diagnostic Test Results:  Results for orders placed or performed in visit on 05/26/23   HEMOGLOBIN A1C     Status: Abnormal   Result Value Ref Range    Hemoglobin A1C 6.6 (H) 0.0 - 5.6 %   Lipid panel reflex to direct LDL Fasting     Status: Abnormal   Result Value Ref Range    Cholesterol 174 <200 mg/dL    Triglycerides 157 (H) <150 mg/dL    Direct Measure HDL 56 >=50 mg/dL    LDL Cholesterol Calculated 87 <=100 mg/dL    Non " HDL Cholesterol 118 <130 mg/dL    Narrative    Cholesterol  Desirable:  <200 mg/dL    Triglycerides  Normal:  Less than 150 mg/dL  Borderline High:  150-199 mg/dL  High:  200-499 mg/dL  Very High:  Greater than or equal to 500 mg/dL    Direct Measure HDL  Female:  Greater than or equal to 50 mg/dL   Male:  Greater than or equal to 40 mg/dL    LDL Cholesterol  Desirable:  <100mg/dL  Above Desirable:  100-129 mg/dL   Borderline High:  130-159 mg/dL   High:  160-189 mg/dL   Very High:  >= 190 mg/dL    Non HDL Cholesterol  Desirable:  130 mg/dL  Above Desirable:  130-159 mg/dL  Borderline High:  160-189 mg/dL  High:  190-219 mg/dL  Very High:  Greater than or equal to 220 mg/dL   Albumin Random Urine Quantitative with Creat Ratio     Status: None   Result Value Ref Range    Creatinine Urine mg/dL 132.0 mg/dL    Albumin Urine mg/L <12.0 mg/L    Albumin Urine mg/g Cr     TSH WITH FREE T4 REFLEX     Status: Normal   Result Value Ref Range    TSH 2.46 0.30 - 4.20 uIU/mL   Comprehensive metabolic panel (BMP + Alb, Alk Phos, ALT, AST, Total. Bili, TP)     Status: Abnormal   Result Value Ref Range    Sodium 142 136 - 145 mmol/L    Potassium 4.2 3.4 - 5.3 mmol/L    Chloride 106 98 - 107 mmol/L    Carbon Dioxide (CO2) 26 22 - 29 mmol/L    Anion Gap 10 7 - 15 mmol/L    Urea Nitrogen 15.3 8.0 - 23.0 mg/dL    Creatinine 0.59 0.51 - 0.95 mg/dL    Calcium 9.4 8.8 - 10.2 mg/dL    Glucose 161 (H) 70 - 99 mg/dL    Alkaline Phosphatase 65 35 - 104 U/L    AST 26 10 - 35 U/L    ALT 35 10 - 35 U/L    Protein Total 6.9 6.4 - 8.3 g/dL    Albumin 4.5 3.5 - 5.2 g/dL    Bilirubin Total 0.5 <=1.2 mg/dL    GFR Estimate >90 >60 mL/min/1.73m2       ASSESSMENT/PLAN:   (Z00.00) Encounter for Medicare annual wellness exam  (primary encounter diagnosis)  Comment: benign exam  Plan:     (Z00.00) Routine general medical examination at a health care facility  Comment:   Plan:     (E11.9) Type 2 diabetes mellitus without complication, without long-term  "current use of insulin (H)  Comment: A1C 6.6 - diabetes well controlled without medication.  Follow up with us in 6 months   No microalbuminuria   On lipitor 20 mg - LDL 87   Plan: HEMOGLOBIN A1C, Lipid panel reflex to direct         LDL Fasting, Albumin Random Urine Quantitative         with Creat Ratio, Comprehensive metabolic panel        (BMP + Alb, Alk Phos, ALT, AST, Total. Bili,         TP), PRIMARY CARE FOLLOW-UP SCHEDULING            (Z12.11) Screen for colon cancer  Comment: agreeable to FIT testing =declines colonoscopy   Plan: Fecal colorectal cancer screen FIT - Future         (S+30)            (E03.8,  E06.3) Hypothyroidism due to Hashimoto's thyroiditis  Comment: euthyroid at current dose- refilled for one year   Plan: TSH WITH FREE T4 REFLEX, PRIMARY CARE FOLLOW-UP        SCHEDULING, levothyroxine         (SYNTHROID/LEVOTHROID) 50 MCG tablet,         DISCONTINUED: levothyroxine         (SYNTHROID/LEVOTHROID) 50 MCG tablet              (E28.39) Estrogen deficiency  Comment: encouraged to check with insurance regarding coverage   Plan: DX Hip/Pelvis/Spine            (E78.5) Hyperlipidemia LDL goal <100  Comment: LDL 87 on lipitor   Plan: atorvastatin (LIPITOR) 20 MG tablet            (Z23) Need for diphtheria-tetanus-pertussis (Tdap) vaccine  Comment:   Plan: Tdap, tetanus-diptheria-acell pertussis,         (BOOSTRIX) 5-2.5-18.5 LF-MCG/0.5 NICHELLE injection              Patient has been advised of split billing requirements and indicates understanding: Yes      COUNSELING:  Reviewed preventive health counseling, as reflected in patient instructions       Regular exercise       Healthy diet/nutrition       Vision screening       Immunizations    Declined: TDAP due to Cost               Osteoporosis prevention/bone health       Colorectal Cancer Screening      BMI:   Estimated body mass index is 27.85 kg/m  as calculated from the following:    Height as of this encounter: 1.516 m (4' 11.69\").    Weight as " of this encounter: 64 kg (141 lb 1.6 oz).         She reports that she has never smoked. She has never used smokeless tobacco.          Nancy Carver PA-C  [facility]

## 2023-05-27 RX ORDER — ATORVASTATIN CALCIUM 20 MG/1
20 TABLET, FILM COATED ORAL DAILY
Qty: 90 TABLET | Refills: 3 | Status: SHIPPED | OUTPATIENT
Start: 2023-05-27 | End: 2024-06-24

## 2023-05-27 RX ORDER — LEVOTHYROXINE SODIUM 50 UG/1
50 TABLET ORAL DAILY
Qty: 90 TABLET | Refills: 3 | Status: SHIPPED | OUTPATIENT
Start: 2023-05-27 | End: 2024-07-02

## 2023-05-27 NOTE — RESULT ENCOUNTER NOTE
Please send letter with lab results     Dear Mercy  Your cholesterol is acceptable on the lipitor.  Your electrolytes, kidney function and liver function were normal.      Your blood sugar was 161.   Your A1C indicates that your diabetes is controlled.   Your urine does not show excess protein.  Your thyroid function was normal on your current dose of levothyroxine.   I have sent over a year of prescriptions.  Please call or MyChart my office with any questions or concerns.  Given your diabetes we should see you in clinic in 6 months.  Nancy Carver, PAC

## 2023-07-26 ENCOUNTER — ANCILLARY PROCEDURE (OUTPATIENT)
Dept: MAMMOGRAPHY | Facility: CLINIC | Age: 68
End: 2023-07-26
Attending: PHYSICIAN ASSISTANT
Payer: COMMERCIAL

## 2023-07-26 ENCOUNTER — ANCILLARY PROCEDURE (OUTPATIENT)
Dept: BONE DENSITY | Facility: CLINIC | Age: 68
End: 2023-07-26
Attending: PHYSICIAN ASSISTANT
Payer: COMMERCIAL

## 2023-07-26 DIAGNOSIS — Z12.31 VISIT FOR SCREENING MAMMOGRAM: ICD-10-CM

## 2023-07-26 DIAGNOSIS — E28.39 ESTROGEN DEFICIENCY: ICD-10-CM

## 2023-07-26 PROCEDURE — 77080 DXA BONE DENSITY AXIAL: CPT | Performed by: RADIOLOGY

## 2023-07-26 PROCEDURE — 77067 SCR MAMMO BI INCL CAD: CPT | Mod: GC | Performed by: STUDENT IN AN ORGANIZED HEALTH CARE EDUCATION/TRAINING PROGRAM

## 2023-07-26 NOTE — RESULT ENCOUNTER NOTE
Please send letter with dexa scan results      Dear Mercy    The bone density test shows osteopenia. This is an intermediate category that is in between normal and osteoporosis.  People with osteopenia should work on taking in 9852-5784 mg of calcium with vitamin D daily. They should also be getting daily weight bearing exercise (walking works).  Please call or MyChart my office with any questions or concerns.      Nancy Carver, PAC

## 2023-07-26 NOTE — LETTER
July 27, 2023      [facility]  [address] [facility]  [place] [address]        Dear ,    We are writing to inform you of your test results.    Dear Mercy     The bone density test shows osteopenia. This is an intermediate category that is in between normal and osteoporosis.  People with osteopenia should work on taking in 8551-8537 mg of calcium with vitamin D daily. They should also be getting daily weight bearing exercise (walking works).   Please call or MyChart my office with any questions or concerns.        Resulted Orders   DX Hip/Pelvis/Spine    Narrative    HISTORY: Estrogen deficiency    COMPARISON:   5/16/2017    Age[age]  years.  Height: 59 inches  Weight: 141 pounds  Sex: Female  Ethnicity:     Image quality: Adequate    Lumbar spine T-score in region of L1-L4 (L3) = -0.3   Lumbar percent change: 5.6%     HIPS:  Mean total hip T-score: -0.9  Mean total hip percent change:-4.6%     Left femoral neck T-score = -1.6  Right femoral neck T-score= -1.5     FRAX:  10 year probability of major osteoporotic fracture: 5.1%  10 year probability of hip fracture: 0.6%  The 10 year probability of fracture may be lower than reported if the  patient has received treatment. FRAX data should be disregarded in  patient's taking bisphosphonates.    World Health Organization definition of osteoporosis and osteopenia  for  women:   Normal: T-score at or above -1.0  Low Bone Mass (Osteopenia): T-score between -1.0 and -2.5.   Osteoporosis: T-score at or below -2.5   T-scores are reported for postmenopausal women and men over 50 years  of age.      Impression    IMPRESSION:  Osteopenia.    TONO ADORNO MD         SYSTEM ID:  [id number]       If you have any questions or concerns, please call the clinic at the number listed above.       Sincerely,      Nancy Carver PA-C

## 2023-07-27 ENCOUNTER — ANCILLARY ORDERS (OUTPATIENT)
Dept: FAMILY MEDICINE | Facility: CLINIC | Age: 68
End: 2023-07-27

## 2023-07-27 DIAGNOSIS — R92.8 ABNORMAL MAMMOGRAM: Primary | ICD-10-CM

## 2023-08-18 ENCOUNTER — ANCILLARY PROCEDURE (OUTPATIENT)
Dept: ULTRASOUND IMAGING | Facility: CLINIC | Age: 68
End: 2023-08-18
Attending: PHYSICIAN ASSISTANT
Payer: COMMERCIAL

## 2023-08-18 ENCOUNTER — ANCILLARY PROCEDURE (OUTPATIENT)
Dept: MAMMOGRAPHY | Facility: CLINIC | Age: 68
End: 2023-08-18
Attending: PHYSICIAN ASSISTANT
Payer: COMMERCIAL

## 2023-08-18 DIAGNOSIS — R92.8 ABNORMAL MAMMOGRAM: ICD-10-CM

## 2023-08-18 PROCEDURE — G0279 TOMOSYNTHESIS, MAMMO: HCPCS | Performed by: STUDENT IN AN ORGANIZED HEALTH CARE EDUCATION/TRAINING PROGRAM

## 2023-08-18 PROCEDURE — 77065 DX MAMMO INCL CAD UNI: CPT | Mod: LT | Performed by: STUDENT IN AN ORGANIZED HEALTH CARE EDUCATION/TRAINING PROGRAM

## 2023-08-18 PROCEDURE — 76642 ULTRASOUND BREAST LIMITED: CPT | Mod: LT | Performed by: STUDENT IN AN ORGANIZED HEALTH CARE EDUCATION/TRAINING PROGRAM

## 2024-06-13 ENCOUNTER — TRANSFERRED RECORDS (OUTPATIENT)
Dept: MULTI SPECIALTY CLINIC | Facility: CLINIC | Age: 69
End: 2024-06-13
Payer: COMMERCIAL

## 2024-06-13 LAB — RETINOPATHY: NORMAL

## 2024-06-22 DIAGNOSIS — E78.5 HYPERLIPIDEMIA LDL GOAL <100: ICD-10-CM

## 2024-06-24 RX ORDER — ATORVASTATIN CALCIUM 20 MG/1
20 TABLET, FILM COATED ORAL DAILY
Qty: 30 TABLET | Refills: 0 | Status: SHIPPED | OUTPATIENT
Start: 2024-06-24 | End: 2024-09-10 | Stop reason: DRUGHIGH

## 2024-07-02 ENCOUNTER — TELEPHONE (OUTPATIENT)
Dept: FAMILY MEDICINE | Facility: CLINIC | Age: 69
End: 2024-07-02

## 2024-07-02 ENCOUNTER — OFFICE VISIT (OUTPATIENT)
Dept: FAMILY MEDICINE | Facility: CLINIC | Age: 69
End: 2024-07-02
Payer: COMMERCIAL

## 2024-07-02 VITALS
HEIGHT: 60 IN | OXYGEN SATURATION: 100 % | TEMPERATURE: 97.7 F | BODY MASS INDEX: 26.5 KG/M2 | HEART RATE: 77 BPM | RESPIRATION RATE: 14 BRPM | DIASTOLIC BLOOD PRESSURE: 88 MMHG | WEIGHT: 135 LBS | SYSTOLIC BLOOD PRESSURE: 131 MMHG

## 2024-07-02 DIAGNOSIS — Z12.11 SCREEN FOR COLON CANCER: ICD-10-CM

## 2024-07-02 DIAGNOSIS — E78.5 HYPERLIPIDEMIA LDL GOAL <100: ICD-10-CM

## 2024-07-02 DIAGNOSIS — Z00.00 ROUTINE GENERAL MEDICAL EXAMINATION AT A HEALTH CARE FACILITY: Primary | ICD-10-CM

## 2024-07-02 DIAGNOSIS — Z12.31 VISIT FOR SCREENING MAMMOGRAM: ICD-10-CM

## 2024-07-02 DIAGNOSIS — Z13.820 SCREENING FOR OSTEOPOROSIS: ICD-10-CM

## 2024-07-02 DIAGNOSIS — E06.3 HYPOTHYROIDISM DUE TO HASHIMOTO'S THYROIDITIS: ICD-10-CM

## 2024-07-02 DIAGNOSIS — E11.9 TYPE 2 DIABETES MELLITUS WITHOUT COMPLICATION, WITHOUT LONG-TERM CURRENT USE OF INSULIN (H): ICD-10-CM

## 2024-07-02 LAB
ALBUMIN SERPL BCG-MCNC: 4.4 G/DL (ref 3.5–5.2)
ALP SERPL-CCNC: 66 U/L (ref 40–150)
ALT SERPL W P-5'-P-CCNC: 26 U/L (ref 0–50)
ANION GAP SERPL CALCULATED.3IONS-SCNC: 7 MMOL/L (ref 7–15)
AST SERPL W P-5'-P-CCNC: 26 U/L (ref 0–45)
BILIRUB SERPL-MCNC: 0.4 MG/DL
BUN SERPL-MCNC: 15.3 MG/DL (ref 8–23)
CALCIUM SERPL-MCNC: 9.4 MG/DL (ref 8.8–10.2)
CHLORIDE SERPL-SCNC: 106 MMOL/L (ref 98–107)
CHOLEST SERPL-MCNC: 198 MG/DL
CREAT SERPL-MCNC: 0.59 MG/DL (ref 0.51–0.95)
CREAT UR-MCNC: 123 MG/DL
DEPRECATED HCO3 PLAS-SCNC: 28 MMOL/L (ref 22–29)
EGFRCR SERPLBLD CKD-EPI 2021: >90 ML/MIN/1.73M2
FASTING STATUS PATIENT QL REPORTED: YES
FASTING STATUS PATIENT QL REPORTED: YES
GLUCOSE SERPL-MCNC: 134 MG/DL (ref 70–99)
HBA1C MFR BLD: 6.4 % (ref 0–5.6)
HDLC SERPL-MCNC: 55 MG/DL
LDLC SERPL CALC-MCNC: 105 MG/DL
MICROALBUMIN UR-MCNC: <12 MG/L
MICROALBUMIN/CREAT UR: NORMAL MG/G{CREAT}
NONHDLC SERPL-MCNC: 143 MG/DL
POTASSIUM SERPL-SCNC: 4 MMOL/L (ref 3.4–5.3)
PROT SERPL-MCNC: 7.7 G/DL (ref 6.4–8.3)
SODIUM SERPL-SCNC: 141 MMOL/L (ref 135–145)
TRIGL SERPL-MCNC: 189 MG/DL
TSH SERPL DL<=0.005 MIU/L-ACNC: 2.12 UIU/ML (ref 0.3–4.2)

## 2024-07-02 PROCEDURE — 84443 ASSAY THYROID STIM HORMONE: CPT | Performed by: PHYSICIAN ASSISTANT

## 2024-07-02 PROCEDURE — 83036 HEMOGLOBIN GLYCOSYLATED A1C: CPT | Performed by: PHYSICIAN ASSISTANT

## 2024-07-02 PROCEDURE — 80061 LIPID PANEL: CPT | Performed by: PHYSICIAN ASSISTANT

## 2024-07-02 PROCEDURE — 36415 COLL VENOUS BLD VENIPUNCTURE: CPT | Performed by: PHYSICIAN ASSISTANT

## 2024-07-02 PROCEDURE — 82570 ASSAY OF URINE CREATININE: CPT | Performed by: PHYSICIAN ASSISTANT

## 2024-07-02 PROCEDURE — 99214 OFFICE O/P EST MOD 30 MIN: CPT | Mod: 25 | Performed by: PHYSICIAN ASSISTANT

## 2024-07-02 PROCEDURE — 82043 UR ALBUMIN QUANTITATIVE: CPT | Performed by: PHYSICIAN ASSISTANT

## 2024-07-02 PROCEDURE — 99397 PER PM REEVAL EST PAT 65+ YR: CPT | Performed by: PHYSICIAN ASSISTANT

## 2024-07-02 PROCEDURE — 80053 COMPREHEN METABOLIC PANEL: CPT | Performed by: PHYSICIAN ASSISTANT

## 2024-07-02 RX ORDER — LEVOTHYROXINE SODIUM 50 UG/1
50 TABLET ORAL DAILY
Qty: 90 TABLET | Refills: 3 | Status: SHIPPED | OUTPATIENT
Start: 2024-07-02

## 2024-07-02 RX ORDER — ATORVASTATIN CALCIUM 40 MG/1
40 TABLET, FILM COATED ORAL DAILY
Qty: 90 TABLET | Refills: 0 | Status: SHIPPED | OUTPATIENT
Start: 2024-07-02 | End: 2024-09-10

## 2024-07-02 RX ORDER — ATORVASTATIN CALCIUM 20 MG/1
20 TABLET, FILM COATED ORAL DAILY
Qty: 30 TABLET | Refills: 0 | Status: CANCELLED | OUTPATIENT
Start: 2024-07-02

## 2024-07-02 SDOH — HEALTH STABILITY: PHYSICAL HEALTH: ON AVERAGE, HOW MANY MINUTES DO YOU ENGAGE IN EXERCISE AT THIS LEVEL?: 30 MIN

## 2024-07-02 SDOH — HEALTH STABILITY: PHYSICAL HEALTH: ON AVERAGE, HOW MANY DAYS PER WEEK DO YOU ENGAGE IN MODERATE TO STRENUOUS EXERCISE (LIKE A BRISK WALK)?: 4 DAYS

## 2024-07-02 ASSESSMENT — SOCIAL DETERMINANTS OF HEALTH (SDOH): HOW OFTEN DO YOU GET TOGETHER WITH FRIENDS OR RELATIVES?: ONCE A WEEK

## 2024-07-02 ASSESSMENT — PAIN SCALES - GENERAL: PAINLEVEL: NO PAIN (0)

## 2024-07-02 NOTE — TELEPHONE ENCOUNTER
"I neglected to offer dexa scan at the time of her appointment.   This is a bone density test- special type of xray  I have ordered a dexa scan for \"screening for osteoporosis\".  Make sure she Checks into insurance coverage.  Schedule at [facility] ([phone]) formerly called [facility]- may schedule on same day as mammogram.   "

## 2024-07-02 NOTE — RESULT ENCOUNTER NOTE
Please call and advise that cholesterol is higher than we would like to see  Increase lipitor from 20 mg to 40 mg daily and schedule fasting labs in 8 weeks to recheck.  Blood sugar was 134.  Diabetes is well controlled. A1C was in prediabetes range   Liver and kidney function tests were normal   Thyroid levels are normal at current dose - continue current dose of levothyroxine.  Urine does not show excess protein.

## 2024-07-02 NOTE — TELEPHONE ENCOUNTER
Patient returned call to office.   Notes she had a DEXA scan done last July (7/23/23)  is asking if she needs another one this year already? Previous results are in Epic chart.  Pt asking to clarify, is this a test done annually? Or every couple years? For example.  Previous DEXA did note that pt has osteopenia and was advised to take calcium with Vitamin D daily, which pt notes she has been taking all this time.  Please clarify plan, per patient request. Thank you!      Caren Glass RN  Clinical Triage/Primary Care  [facility]

## 2024-07-02 NOTE — TELEPHONE ENCOUNTER
RN called patient and relayed message from provider below.     RN reviewed education for lifestyle modifications and exercise with patient.     Patient is scheduled for lipid panel recheck on 9/6/24 at 7am. RN reviewed fasting lab instructions with patient.     She verbalized understanding and denies further questions or concerns at this time.    Mindy Madison RN, BSN, [facility]  Nurse Triage, [facility]        ----- Message from Nancy Carver sent at 7/2/2024  3:59 PM CDT -----    Please call and advise that cholesterol is higher than we would like to see  Increase lipitor from 20 mg to 40 mg daily and schedule fasting labs in 8 weeks to recheck.  Blood sugar was 134.  Diabetes is well controlled. A1C was in prediabetes range   Liver and kidney function tests were normal   Thyroid levels are normal at current dose - continue current dose of levothyroxine.  Urine does not show excess protein.

## 2024-07-02 NOTE — LETTER
August 7, 2024      Mercy Johnsonhbun  [address] [place] [address]        Dear Mercy   Your stool was normal with no evidence of blood.  This is a screening test for colon cancer and though it is not as thorough as a full colonoscopy it has been found accurate in detecting colon lesions.  We should repeat this annually.    Please call or MyChart my office with any questions or concerns.   Nancy Carver, PAC       Resulted Orders   Fecal colorectal cancer screen FIT - Future (S+30)   Result Value Ref Range    Occult Blood Screen FIT Negative Negative   HEMOGLOBIN A1C   Result Value Ref Range    Hemoglobin A1C 6.4 (H) 0.0 - 5.6 %      Comment:      Normal <5.7%   Prediabetes 5.7-6.4%    Diabetes 6.5% or higher     Note: Adopted from ADA consensus guidelines.   Lipid panel reflex to direct LDL Non-fasting   Result Value Ref Range    Cholesterol 198 <200 mg/dL    Triglycerides 189 (H) <150 mg/dL    Direct Measure HDL 55 >=50 mg/dL    LDL Cholesterol Calculated 105 (H) <=100 mg/dL    Non HDL Cholesterol 143 (H) <130 mg/dL    Patient Fasting > 8hrs? Yes     Narrative    Cholesterol  Desirable:  <200 mg/dL    Triglycerides  Normal:  Less than 150 mg/dL  Borderline High:  150-199 mg/dL  High:  200-499 mg/dL  Very High:  Greater than or equal to 500 mg/dL    Direct Measure HDL  Female:  Greater than or equal to 50 mg/dL   Male:  Greater than or equal to 40 mg/dL    LDL Cholesterol  Desirable:  <100mg/dL  Above Desirable:  100-129 mg/dL   Borderline High:  130-159 mg/dL   High:  160-189 mg/dL   Very High:  >= 190 mg/dL    Non HDL Cholesterol  Desirable:  130 mg/dL  Above Desirable:  130-159 mg/dL  Borderline High:  160-189 mg/dL  High:  190-219 mg/dL  Very High:  Greater than or equal to 220 mg/dL   Albumin Random Urine Quantitative with Creat Ratio   Result Value Ref Range    Creatinine Urine mg/dL 123.0 mg/dL      Comment:      The reference ranges have not been established in urine creatinine. The  results should be integrated into the clinical context for interpretation.    Albumin Urine mg/L <12.0 mg/L      Comment:      The reference ranges have not been established in urine albumin. The results should be integrated into the clinical context for interpretation.    Albumin Urine mg/g Cr        Comment:      Unable to calculate, urine albumin and/or urine creatinine is outside detectable limits.  Microalbuminuria is defined as an albumin:creatinine ratio of 17 to 299 for males and 25 to 299 for females. A ratio of albumin:creatinine of 300 or higher is indicative of overt proteinuria.  Due to biologic variability, positive results should be confirmed by a second, first-morning random or 24-hour timed urine specimen. If there is discrepancy, a third specimen is recommended. When 2 out of 3 results are in the microalbuminuria range, this is evidence for incipient nephropathy and warrants increased efforts at glucose control, blood pressure control, and institution of therapy with an angiotensin-converting-enzyme (ACE) inhibitor (if the patient can tolerate it).     TSH WITH FREE T4 REFLEX   Result Value Ref Range    TSH 2.12 0.30 - 4.20 uIU/mL   Comprehensive metabolic panel (BMP + Alb, Alk Phos, ALT, AST, Total. Bili, TP)   Result Value Ref Range    Sodium 141 135 - 145 mmol/L    Potassium 4.0 3.4 - 5.3 mmol/L    Carbon Dioxide (CO2) 28 22 - 29 mmol/L    Anion Gap 7 7 - 15 mmol/L    Urea Nitrogen 15.3 8.0 - 23.0 mg/dL    Creatinine 0.59 0.51 - 0.95 mg/dL    GFR Estimate >90 >60 mL/min/1.73m2      Comment:      eGFR calculated using 2021 CKD-EPI equation.    Calcium 9.4 8.8 - 10.2 mg/dL    Chloride 106 98 - 107 mmol/L    Glucose 134 (H) 70 - 99 mg/dL    Alkaline Phosphatase 66 40 - 150 U/L    AST 26 0 - 45 U/L      Comment:      Reference intervals for this test were updated on 6/12/2023 to more accurately reflect our healthy population. There may be differences in the flagging of prior results with similar  values performed with this method. Interpretation of those prior results can be made in the context of the updated reference intervals.    ALT 26 0 - 50 U/L      Comment:      Reference intervals for this test were updated on 6/12/2023 to more accurately reflect our healthy population. There may be differences in the flagging of prior results with similar values performed with this method. Interpretation of those prior results can be made in the context of the updated reference intervals.      Protein Total 7.7 6.4 - 8.3 g/dL    Albumin 4.4 3.5 - 5.2 g/dL    Bilirubin Total 0.4 <=1.2 mg/dL    Patient Fasting > 8hrs? Yes

## 2024-07-02 NOTE — TELEPHONE ENCOUNTER
Writer called Mercy and relayed provider message below as written. She verbalized understanding and had no further questions or concerns at this time.    Jorge Monroy RN  [facility]

## 2024-07-02 NOTE — PROGRESS NOTES
Preventive Care Visit  [facility]  Nancy Carver PA-C, Family Medicine  Jul 2, 2024      Assessment & Plan     Routine general medical examination at a health care facility  Benign exam - declines all vaccinations     Type 2 diabetes mellitus without complication, without long-term current use of insulin (H)  A1C 6.4-diabetes well controlled without medication  - HEMOGLOBIN A1C  - Lipid panel reflex to direct LDL Non-fasting  - Albumin Random Urine Quantitative with Creat Ratio  - Comprehensive metabolic panel (BMP + Alb, Alk Phos, ALT, AST, Total. Bili, TP)  - HEMOGLOBIN A1C  - Lipid panel reflex to direct LDL Non-fasting  - Albumin Random Urine Quantitative with Creat Ratio  - Comprehensive metabolic panel (BMP + Alb, Alk Phos, ALT, AST, Total. Bili, TP)    Hyperlipidemia LDL goal <100  , elevated triglycerides. Increase lipitor from 20 mg to 40 mg daily and recheck labs in approximately 8 weeks   - Lipid panel reflex to direct LDL Non-fasting  - Comprehensive metabolic panel (BMP + Alb, Alk Phos, ALT, AST, Total. Bili, TP)  - Lipid panel reflex to direct LDL Non-fasting  - Comprehensive metabolic panel (BMP + Alb, Alk Phos, ALT, AST, Total. Bili, TP)  - atorvastatin (LIPITOR) 40 MG tablet  Dispense: 90 tablet; Refill: 0    Hypothyroidism due to Hashimoto's thyroiditis  Euthyroid at current dose   - TSH WITH FREE T4 REFLEX  - TSH WITH FREE T4 REFLEX  - levothyroxine (SYNTHROID/LEVOTHROID) 50 MCG tablet  Dispense: 90 tablet; Refill: 3    Screen for colon cancer  Declines colonoscopy- agreeable to cologuard   - Fecal colorectal cancer screen FIT - Future (S+30)  - Fecal colorectal cancer screen FIT - Future (S+30)    Visit for screening mammogram    - MA Screen Bilateral w/Jacob    Screening for osteoporosis  Dexa last year with osteopenia               BMI  Estimated body mass index is 26.81 kg/m  as calculated from the following:    Height as of this encounter: 1.511 m (4'  "11.5\").    Weight as of this encounter: 61.2 kg (135 lb).       Counseling  Appropriate preventive services were discussed with this patient, including applicable screening as appropriate for fall prevention, nutrition, physical activity, Tobacco-use cessation, weight loss and cognition.  Checklist reviewing preventive services available has been given to the patient.  Reviewed patient's diet, addressing concerns and/or questions.   She is at risk for psychosocial distress and has been provided with information to reduce risk.       We will call with lab results if abnormal.  We will send a letter if lab results are normal.  Continue medications as you have been taking  Schedule mammogram after 8/18/24  Bring in FIT card for colon cancer screening.  Follow-up us in clinic in 6 months     Subjective   Mercy is a 69 year old, presenting for the following:  Physical        7/2/2024     9:05 AM   Additional Questions   Roomed by Eric   Accompanied by self         7/2/2024     9:05 AM   Patient Reported Additional Medications   Patient reports taking the following new medications no        Via the Health Maintenance questionnaire, the patient has reported the following services have been completed -Eye Exam: [facility] 2024-06-13, this information has been sent to the abstraction team.  Health Care Directive  Patient does not have a Health Care Directive or Living Will: Discussed advance care planning with patient; however, patient declined at this time.    HPI  Patient known to me with history of type 2 diabetes managed with diet, hypothyroidism and hyperlipidemia presents for annual exam. 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  Wants to retire to help care for grandchildren - aged 2, 3 and four year old - cares for grandchildren- very stressful  Doesn't really check blood sugars outside of clinic               7/2/2024   General Health   How would you rate your overall physical " health? Good   Feel stress (tense, anxious, or unable to sleep) Only a little      (!) STRESS CONCERN      7/2/2024   Nutrition   Diet: I don't know            7/2/2024   Exercise   Days per week of moderate/strenous exercise 4 days   Average minutes spent exercising at this level 30 min            7/2/2024   Social Factors   Frequency of gathering with friends or relatives Once a week   Worry food won't last until get money to buy more No   Food not last or not have enough money for food? No   Do you have housing? (Housing is defined as stable permanent housing and does not include staying ouside in a car, in a tent, in an abandoned building, in an overnight shelter, or couch-surfing.) Yes   Are you worried about losing your housing? No   Lack of transportation? No   Unable to get utilities (heat,electricity)? No            7/2/2024   Fall Risk   Fallen 2 or more times in the past year? No    No   Trouble with walking or balance? No    No       Multiple values from one day are sorted in reverse-chronological order          7/2/2024   Activities of Daily Living- Home Safety   Needs help with the following daily activites None of the above   Safety concerns in the home None of the above            7/2/2024   Dental   Dentist two times every year? Yes            7/2/2024   Hearing Screening   Hearing concerns? None of the above            7/2/2024   Driving Risk Screening   Patient/family members have concerns about driving No            7/2/2024   General Alertness/Fatigue Screening   Have you been more tired than usual lately? No            7/2/2024   Urinary Incontinence Screening   Bothered by leaking urine in past 6 months No            7/2/2024   TB Screening   Were you born outside of the [place]? Yes            Today's PHQ-2 Score:       7/2/2024     9:08 AM   PHQ-2 ( 1999 Pf[employer])   Q1: Little interest or pleasure in doing things 0   Q2: Feeling down, depressed or hopeless 0   PHQ-2 Score 0   Q1: Little interest or  pleasure in doing things Not at all   Q2: Feeling down, depressed or hopeless Not at all   PHQ-2 Score 0           7/2/2024   Substance Use   Alcohol more than 3/day or more than 7/wk No   Do you have a current opioid prescription? No   How severe/bad is pain from 1 to 10? 0/10 (No Pain)   Do you use any other substances recreationally? No    (!) DECLINE       Multiple values from one day are sorted in reverse-chronological order     Social History     Tobacco Use    Smoking status: Never     Passive exposure: Never    Smokeless tobacco: Never    Tobacco comments:     smoke free household.   Vaping Use    Vaping status: Never Used   Substance Use Topics    Alcohol use: No    Drug use: No           7/26/2023   LAST FHS-7 RESULTS   1st degree relative breast or ovarian cancer Yes   Any relative bilateral breast cancer No   Any male have breast cancer No   Any ONE woman have BOTH breast AND ovarian cancer No   Any woman with breast cancer before 50yrs Yes   2 or more relatives with breast AND/OR ovarian cancer No   2 or more relatives with breast AND/OR bowel cancer No           Mammogram Screening - Mammogram every 1-2 years updated in Health Maintenance based on mutual decision making      History of abnormal Pap smear: no        Latest Ref Rng & Units 3/2/2021     9:30 AM 3/2/2021     9:25 AM 10/31/2016     8:13 AM   PAP / HPV   PAP (Historical)   NIL     HPV 16 DNA NEG^Negative Negative   Negative    HPV 18 DNA NEG^Negative Negative   Negative    Other HR HPV NEG^Negative Negative   Negative      ASCVD Risk   The 10-year ASCVD risk score (Bigg LEIVA, et al., 2019) is: 12.6%    Values used to calculate the score:      Age: 69 years      Sex: Female      Is Non- : No      Diabetic: Yes      Tobacco smoker: No      Systolic Blood Pressure: 131 mmHg      Is BP treated: No      HDL Cholesterol: 56 mg/dL      Total Cholesterol: 174 mg/dL            Reviewed and updated as needed this visit  by Provider   Tobacco     Med Hx  Surg Hx  Fam Hx  Soc Hx Sexual Activity          BP Readings from Last 3 Encounters:   24 131/88   23 112/67   22 112/70    Wt Readings from Last 3 Encounters:   24 61.2 kg (135 lb)   23 64 kg (141 lb 1.6 oz)   22 65 kg (143 lb 3.2 oz)                  Patient Active Problem List   Diagnosis    Dermatophytosis of nail    Contact dermatitis and other eczema, due to unspecified cause    Plantar fascial fibromatosis    Family history of diabetes mellitus    Hyperlipidemia LDL goal <100    Pre-diabetes    Cervical polyp    Anxiety    Food allergies    Osteopenia    Bacterial keratitis, os    Corneal erosion syndrome?    Overweight    Type 2 diabetes mellitus without complication, without long-term current use of insulin (H)    Infection due to 2019 novel coronavirus    Hypothyroidism due to Hashimoto's thyroiditis     Past Surgical History:   Procedure Laterality Date     SECTION      x2    LUMPECTOMY BREAST  age 23    benign       Social History     Tobacco Use    Smoking status: Never     Passive exposure: Never    Smokeless tobacco: Never    Tobacco comments:     smoke free household.   Substance Use Topics    Alcohol use: No     Family History   Problem Relation Age of Onset    Cancer Mother         pancreatic cancer,  60    Diabetes Mother     Thyroid Disease Mother     Diabetes Maternal Grandmother     Breast Cancer Sister     Thyroid Disease Sister     Asthma No family hx of     C.A.D. No family hx of     Hypertension No family hx of     Cerebrovascular Disease No family hx of     Cancer - colorectal No family hx of     Prostate Cancer No family hx of          Current Outpatient Medications   Medication Sig Dispense Refill    alcohol swab prep pads Use to swab area of injection/melody as directed. 100 each 3    atorvastatin (LIPITOR) 20 MG tablet TAKE 1 TABLET BY MOUTH EVERY DAY 30 tablet 0    blood glucose (NO BRAND SPECIFIED)  test strip Use to test blood sugar 1 times daily or as directed. To accompany: Blood Glucose Monitor Brands: per insurance. 100 strip 6    blood glucose calibration (NO BRAND SPECIFIED) solution To accompany: Blood Glucose Monitor Brands: per insurance. 1 Bottle 3    blood glucose monitoring (FATOU MICROLET) lancets Use to test blood sugar 1 times daily 100 each 3    blood glucose monitoring (NO BRAND SPECIFIED) meter device kit Use to test blood sugar 1 times daily or as directed. Preferred blood glucose meter OR supplies to accompany: Blood Glucose Monitor Brands: per insurance. 1 kit 0    Blood Glucose Monitoring Suppl (CONTOUR NEXT ONE) KIT 1 Device daily 1 kit 0    Calcium Citrate-Vitamin D (CALCIUM + D PO)       Cholecalciferol (VITAMIN D3 PO) Take by mouth daily      Glucos-MSM-C-Qe-Okypoi-Wswnuq (GLUCOSAMINE MSM COMPLEX) TABS tablet Take 1 tablet by mouth daily      levothyroxine (SYNTHROID/LEVOTHROID) 50 MCG tablet Take 1 tablet (50 mcg) by mouth daily 90 tablet 3    multivitamin w/minerals (THERA-VIT-M) tablet Take 1 tablet by mouth daily      Omega-3 Fatty Acids (FISH OIL) 500 MG CAPS        Current providers sharing in care for this patient include:  Patient Care Team:  Nancy Carver PA-C as PCP - General (Family Practice)  Nancy Carver PA-C as Assigned PCP  Marta Lane RD as Diabetes Educator (Dietitian, Registered)    The following health maintenance items are reviewed in Epic and correct as of today:  Health Maintenance   Topic Date Due    Pneumococcal Vaccine: 65+ Years (1 of 2 - PCV) Never done    ZOSTER IMMUNIZATION (1 of 2) Never done    DTAP/TDAP/TD IMMUNIZATION (2 - Td or Tdap) 03/02/2017    RSV VACCINE (Pregnancy & 60+) (1 - 1-dose 60+ series) Never done    COLORECTAL CANCER SCREENING  05/26/2022    DIABETIC FOOT EXAM  03/18/2023    COVID-19 Vaccine (2 - 2023-24 season) 09/01/2023    A1C  11/26/2023    EYE EXAM  12/01/2023    MEDICARE ANNUAL WELLNESS VISIT  05/26/2024     "BMP  05/26/2024    LIPID  05/26/2024    MICROALBUMIN  05/26/2024    TSH W/FREE T4 REFLEX  05/26/2024    ANNUAL REVIEW OF HM ORDERS  05/26/2024    MAMMO SCREENING  08/18/2024    INFLUENZA VACCINE (1) 09/01/2024    ADVANCE CARE PLANNING  02/01/2025    FALL RISK ASSESSMENT  07/02/2025    DEXA  07/26/2038    HEPATITIS C SCREENING  Completed    PHQ-2 (once per calendar year)  Completed    IPV IMMUNIZATION  Aged Out    HPV IMMUNIZATION  Aged Out    MENINGITIS IMMUNIZATION  Aged Out    RSV MONOCLONAL ANTIBODY  Aged Out    PAP  Discontinued         Review of Systems  CONSTITUTIONAL: NEGATIVE for fever, chills, change in weight  INTEGUMENTARY/SKIN: NEGATIVE for worrisome rashes, moles or lesions  EYES: NEGATIVE for vision changes or irritation  ENT/MOUTH: NEGATIVE for ear, mouth and throat problems  RESP: NEGATIVE for significant cough or SOB  BREAST: NEGATIVE for masses, tenderness or discharge  CV: NEGATIVE for chest pain, palpitations or peripheral edema  GI: NEGATIVE for nausea, abdominal pain, heartburn, or change in bowel habits  : NEGATIVE for frequency, dysuria, or hematuria  MUSCULOSKELETAL: NEGATIVE for significant arthralgias or myalgia  NEURO: NEGATIVE for weakness, dizziness or paresthesias  ENDOCRINE: NEGATIVE for temperature intolerance, skin/hair changes  HEME: NEGATIVE for bleeding problems  PSYCHIATRIC: NEGATIVE for changes in mood or affect     Objective    Exam  /88 (BP Location: Right arm, Patient Position: Sitting, Cuff Size: Adult Regular)   Pulse 77   Temp 97.7  F (36.5  C) (Temporal)   Resp 14   Ht 1.511 m (4' 11.5\")   Wt 61.2 kg (135 lb)   SpO2 100%   BMI 26.81 kg/m     Estimated body mass index is 26.81 kg/m  as calculated from the following:    Height as of this encounter: 1.511 m (4' 11.5\").    Weight as of this encounter: 61.2 kg (135 lb).    Physical Exam  GENERAL: alert and no distress  EYES: Eyes grossly normal to inspection, PERRL and conjunctivae and sclerae normal  HENT: ear " canals and TM's normal, nose and mouth without ulcers or lesions  NECK: no adenopathy, no asymmetry, masses, or scars  RESP: lungs clear to auscultation - no rales, rhonchi or wheezes  BREAST: normal without masses, tenderness or nipple discharge and no palpable axillary masses or adenopathy  CV: regular rate and rhythm, normal S1 S2, no S3 or S4, no murmur, click or rub, no peripheral edema  ABDOMEN: soft, nontender, no hepatosplenomegaly, no masses and bowel sounds normal  MS: no gross musculoskeletal defects noted, no edema  SKIN: no suspicious lesions or rashes  NEURO: Normal strength and tone, mentation intact and speech normal  PSYCH: mentation appears normal, affect normal/bright         No data to display                      Signed Electronically by: Nancy Carver PA-C

## 2024-07-02 NOTE — TELEPHONE ENCOUNTER
Sorry I missed that.  My deep apologies- we would repeat every 2 yrs so isn't due until 7/26/25

## 2024-07-02 NOTE — PATIENT INSTRUCTIONS
"We will call with lab results if abnormal.  We will send a letter if lab results are normal.  Continue medications as you have been taking  Schedule mammogram after 8/18/24  Bring in FIT card for colon cancer screening.  Follow-up us in clinic in 6 months     Patient Education   Preventive Care Advice   This is general advice we often give to help people stay healthy. Your care team may have specific advice just for you. Please talk to your care team about your own preventive care needs.  Lifestyle  Exercise at least 150 minutes each week (30 minutes a day, 5 days a week).  Do muscle strengthening activities 2 days a week. These help control your weight and prevent disease.  No smoking.  Wear sunscreen to prevent skin cancer.  Have your home tested for radon every 2 to 5 years. Radon is a colorless, odorless gas that can harm your lungs. To learn more, go to www.health.[facility].[place].us and search for \"Radon in Homes.\"  Keep guns unloaded and locked up in a safe place like a safe or gun vault, or, use a gun lock and hide the keys. Always lock away bullets separately. To learn more, visit [place].gov and search for \"safe gun storage.\"  Nutrition  Eat 5 or more servings of fruits and vegetables each day.  Try wheat bread, brown rice and whole grain pasta (instead of white bread, rice, and pasta).  Get enough calcium and vitamin D. Check the label on foods and aim for 100% of the RDA (recommended daily allowance).  Regular exams  Have a dental exam and cleaning every 6 months.  See your health care team every year to talk about:  Any changes in your health.  Any medicines your care team has prescribed.  Preventive care, family planning, and ways to prevent chronic diseases.  Shots (vaccines)   HPV shots (up to age 26), if you've never had them before.  Hepatitis B shots (up to age 59), if you've never had them before.  COVID-19 shot: Get this shot when it's due.  Flu shot: Get a flu shot every year.  Tetanus shot: Get a " tetanus shot every 10 years.  Pneumococcal, hepatitis A, and RSV shots: Ask your care team if you need these based on your risk.  Shingles shot (for age 50 and up).  General health tests  Diabetes screening:  Starting at age 35, Get screened for diabetes at least every 3 years.  If you are younger than age 35, ask your care team if you should be screened for diabetes.  Cholesterol test: At age 39, start having a cholesterol test every 5 years, or more often if advised.  Bone density scan (DEXA): At age 50, ask your care team if you should have this scan for osteoporosis (brittle bones).  Hepatitis C: Get tested at least once in your life.  Abdominal aortic aneurysm screening: Talk to your doctor about having this screening if you:  Have ever smoked; and  Are biologically male; and  Are between the ages of 65 and 75.  STIs (sexually transmitted infections)  Before age 24: Ask your care team if you should be screened for STIs.  After age 24: Get screened for STIs if you're at risk. You are at risk for STIs (including HIV) if:  You are sexually active with more than one person.  You don't use condoms every time.  You or a partner was diagnosed with a sexually transmitted infection.  If you are at risk for HIV, ask about PrEP medicine to prevent HIV.  Get tested for HIV at least once in your life, whether you are at risk for HIV or not.  Cancer screening tests  Cervical cancer screening: If you have a cervix, begin getting regular cervical cancer screening tests at age 21. Most people who have regular screenings with normal results can stop after age 65. Talk about this with your provider.  Breast cancer scan (mammogram): If you've ever had breasts, begin having regular mammograms starting at age 40. This is a scan to check for breast cancer.  Colon cancer screening: It is important to start screening for colon cancer at age 45.  Have a colonoscopy test every 10 years (or more often if you're at risk) Or, ask your  provider about stool tests like a FIT test every year or Cologuard test every 3 years.  To learn more about your testing options, visit: www.Symcircle/151091.pdf.  For help making a decision, visit: tiny/ya49918.  Prostate cancer screening test: If you have a prostate and are age 55 to 69, ask your provider if you would benefit from a yearly prostate cancer screening test.  Lung cancer screening: If you are a current or former smoker age 50 to 80, ask your care team if ongoing lung cancer screenings are right for you.  For informational purposes only. Not to replace the advice of your health care provider. Copyright   2023 [facility]. All rights reserved. Clinically reviewed by the [facility] Transitions Program. [employer] 253626 - REV 04/24.

## 2024-07-05 DIAGNOSIS — E78.5 HYPERLIPIDEMIA LDL GOAL <100: ICD-10-CM

## 2024-07-08 RX ORDER — ATORVASTATIN CALCIUM 20 MG/1
20 TABLET, FILM COATED ORAL DAILY
Qty: 90 TABLET | Refills: 1 | OUTPATIENT
Start: 2024-07-08

## 2024-07-16 ENCOUNTER — TELEPHONE (OUTPATIENT)
Dept: FAMILY MEDICINE | Facility: CLINIC | Age: 69
End: 2024-07-16

## 2024-07-16 NOTE — TELEPHONE ENCOUNTER
Patient Quality Outreach    Patient is due for the following:   Colon Cancer Screening    Next Steps:   Schedule a office visit for      Type of outreach:    Sent Medium message.      Questions for provider review:    None           Faby Alejandro MA

## 2024-07-16 NOTE — LETTER
July 16, 2024    [facility]  [address] [place] [address]    Dear Mercy,    At [facility] we care about your health and are committed to providing quality patient care.     Here is a list of Health Maintenance topics that are due now or due soon:  Health Maintenance Due   Topic Date Due    Pneumococcal Vaccine: 65+ Years (1 of 2 - PCV) Never done    ZOSTER IMMUNIZATION (1 of 2) Never done    DTAP/TDAP/TD IMMUNIZATION (2 - Td or Tdap) 03/02/2017    RSV VACCINE (Pregnancy & 60+) (1 - 1-dose 60+ series) Never done    COLORECTAL CANCER SCREENING  05/26/2022    DIABETIC FOOT EXAM  03/18/2023    COVID-19 Vaccine (2 - 2023-24 season) 09/01/2023        We are recommending that you:  Schedule a COLONOSCOPY to assess for colon cancer (due every 10 years or 5 years in higher risk situations.)       Colon cancer is now the second leading cause of cancer-related deaths in the [place] for both men and women and there are over 130,000 new cases and 50,000 deaths per year from colon cancer.  Colonoscopies can prevent 90-95% of these deaths.  Problem lesions can be removed before they ever become cancer.  This test is not only looking for cancer, but also getting rid of precancerious lesions.    If you are under/uninsured, we recommend you contact the Patients Know Bests program. Patients Know Bests is a free colorectal cancer screening program that provides colonoscopies for eligible under/uninsured [place] men and women. If you are interested in receiving a free colonoscopy, please call [employer] at [phone] (mention code Sc[employer]Web) to see if you re eligible.     If you do not wish to do a colonoscopy or cannot afford to do one, at this time, there is another option. It is called a FIT test or Fecal Immunochemical Occult Blood Test (take home stool sample kit).  It does not replace the colonoscopy for colorectal cancer screening, but it can detect hidden bleeding in the lower colon.   It does need to be repeated every year and if a positive result is obtained, you would be referred for a colonoscopy.    If you have completed either one of these tests at another facility, please call/respond to this message with the details of when and where the tests were done and if they were normal or not. Or have the records sent to our clinic so that we can best coordinate your care.    To schedule an appointment or discuss this further, you may contact us by phone at the [facility] [place] at [phone] or online through the patient [email or web address] @ [email or web address]    Thank you for trusting [facility] and we appreciate the opportunity to serve you.  We look forward to supporting your healthcare needs in the future.    Your partners in health,      Quality Committee at [facility]

## 2024-08-02 PROCEDURE — 82274 ASSAY TEST FOR BLOOD FECAL: CPT | Performed by: PHYSICIAN ASSISTANT

## 2024-08-07 LAB — HEMOCCULT STL QL IA: NEGATIVE

## 2024-08-07 NOTE — RESULT ENCOUNTER NOTE
Please send letter    Dear Mercy  Your stool was normal with no evidence of blood.  This is a screening test for colon cancer and though it is not as thorough as a full colonoscopy it has been found accurate in detecting colon lesions.  We should repeat this annually.    Please call or MyChart my office with any questions or concerns.   Nancy Carver, PAC

## 2024-09-10 ENCOUNTER — TELEPHONE (OUTPATIENT)
Dept: FAMILY MEDICINE | Facility: CLINIC | Age: 69
End: 2024-09-10

## 2024-09-10 ENCOUNTER — LAB (OUTPATIENT)
Dept: LAB | Facility: CLINIC | Age: 69
End: 2024-09-10
Payer: MEDICARE

## 2024-09-10 DIAGNOSIS — E78.5 HYPERLIPIDEMIA LDL GOAL <100: ICD-10-CM

## 2024-09-10 LAB
ALT SERPL W P-5'-P-CCNC: 22 U/L (ref 0–50)
AST SERPL W P-5'-P-CCNC: 24 U/L (ref 0–45)
CHOLEST SERPL-MCNC: 160 MG/DL
FASTING STATUS PATIENT QL REPORTED: YES
HDLC SERPL-MCNC: 54 MG/DL
LDLC SERPL CALC-MCNC: 83 MG/DL
NONHDLC SERPL-MCNC: 106 MG/DL
TRIGL SERPL-MCNC: 114 MG/DL

## 2024-09-10 PROCEDURE — 84460 ALANINE AMINO (ALT) (SGPT): CPT

## 2024-09-10 PROCEDURE — 36415 COLL VENOUS BLD VENIPUNCTURE: CPT

## 2024-09-10 PROCEDURE — 84450 TRANSFERASE (AST) (SGOT): CPT

## 2024-09-10 PROCEDURE — 80061 LIPID PANEL: CPT

## 2024-09-10 RX ORDER — ATORVASTATIN CALCIUM 40 MG/1
40 TABLET, FILM COATED ORAL DAILY
Qty: 90 TABLET | Refills: 2 | Status: SHIPPED | OUTPATIENT
Start: 2024-09-10 | End: 2024-09-10 | Stop reason: SINTOL

## 2024-09-10 RX ORDER — ATORVASTATIN CALCIUM 20 MG/1
20 TABLET, FILM COATED ORAL DAILY
Qty: 90 TABLET | Refills: 0 | Status: SHIPPED | OUTPATIENT
Start: 2024-09-10

## 2024-09-10 NOTE — TELEPHONE ENCOUNTER
Cut back down to 20 mg daily and schedule fasting lab only appointment in approximately 3 months to see if lifestyle helpful

## 2024-09-10 NOTE — RESULT ENCOUNTER NOTE
Please call and advise that cholesterol looks good on the 40 mg- I sent over a 90 day supply with 2 refills.  Liver tests were normal   Please call or MyChart my office with any questions or concerns.

## 2024-09-10 NOTE — TELEPHONE ENCOUNTER
Called and spoke with pt.    Relayed recommendation from provider.     Pt verbalized understanding. Scheduled follow up lab appt for Dec 27.    Alecia Genao RN, BSN  [facility]

## 2024-09-10 NOTE — TELEPHONE ENCOUNTER
Called and notified patient of the message below from her PCP.    Pt verbalized understanding.    Says she is wondering if she would be able to ever go back down to the 20mg of Atorvastatin. She has noticed that she is having more muscle aches than usual and wonders if that is from the medication. She says since her last visit, she has really been watching what she eats and has started walking on a daily basis, so would like to see if that would be enough to keep her cholesterol down.    Will route message to PCP to review and advise.    Alecia Genao, RN, BSN  -[facility]

## 2024-10-25 ENCOUNTER — ANCILLARY PROCEDURE (OUTPATIENT)
Dept: MAMMOGRAPHY | Facility: CLINIC | Age: 69
End: 2024-10-25
Attending: PHYSICIAN ASSISTANT
Payer: MEDICARE

## 2024-10-25 DIAGNOSIS — Z12.31 VISIT FOR SCREENING MAMMOGRAM: ICD-10-CM

## 2024-10-25 PROCEDURE — 77067 SCR MAMMO BI INCL CAD: CPT | Mod: GC | Performed by: RADIOLOGY

## 2024-10-25 PROCEDURE — 77063 BREAST TOMOSYNTHESIS BI: CPT | Mod: GC | Performed by: RADIOLOGY

## 2025-03-04 DIAGNOSIS — E78.5 HYPERLIPIDEMIA LDL GOAL <100: ICD-10-CM

## 2025-03-04 RX ORDER — ATORVASTATIN CALCIUM 20 MG/1
20 TABLET, FILM COATED ORAL DAILY
Qty: 90 TABLET | Refills: 0 | Status: SHIPPED | OUTPATIENT
Start: 2025-03-04

## 2025-05-12 DIAGNOSIS — E78.5 HYPERLIPIDEMIA LDL GOAL <100: ICD-10-CM

## 2025-05-13 RX ORDER — ATORVASTATIN CALCIUM 20 MG/1
20 TABLET, FILM COATED ORAL DAILY
Qty: 90 TABLET | Refills: 0 | Status: SHIPPED | OUTPATIENT
Start: 2025-05-13

## 2025-06-02 ENCOUNTER — PATIENT OUTREACH (OUTPATIENT)
Dept: CARE COORDINATION | Facility: CLINIC | Age: 70
End: 2025-06-02
Payer: MEDICARE

## 2025-07-09 ENCOUNTER — PATIENT OUTREACH (OUTPATIENT)
Dept: CARE COORDINATION | Facility: CLINIC | Age: 70
End: 2025-07-09
Payer: MEDICARE

## 2025-07-10 DIAGNOSIS — Z12.11 COLON CANCER SCREENING: ICD-10-CM

## 2025-07-24 ENCOUNTER — ORDERS ONLY (AUTO-RELEASED) (OUTPATIENT)
Dept: URGENT CARE | Facility: CLINIC | Age: 70
End: 2025-07-24
Payer: MEDICARE

## 2025-07-24 DIAGNOSIS — Z12.11 COLON CANCER SCREENING: ICD-10-CM

## 2025-08-24 DIAGNOSIS — E06.3 HYPOTHYROIDISM DUE TO HASHIMOTO'S THYROIDITIS: ICD-10-CM

## 2025-08-25 RX ORDER — LEVOTHYROXINE SODIUM 50 UG/1
50 TABLET ORAL DAILY
Qty: 30 TABLET | Refills: 0 | Status: SHIPPED | OUTPATIENT
Start: 2025-08-25

## 2025-08-28 ENCOUNTER — TELEPHONE (OUTPATIENT)
Dept: FAMILY MEDICINE | Facility: CLINIC | Age: 70
End: 2025-08-28
Payer: MEDICARE

## 2025-08-28 DIAGNOSIS — E78.5 HYPERLIPIDEMIA LDL GOAL <100: ICD-10-CM

## 2025-08-28 RX ORDER — ATORVASTATIN CALCIUM 20 MG/1
20 TABLET, FILM COATED ORAL DAILY
Qty: 60 TABLET | Refills: 0 | Status: SHIPPED | OUTPATIENT
Start: 2025-08-28